# Patient Record
Sex: MALE | Race: WHITE | URBAN - METROPOLITAN AREA
[De-identification: names, ages, dates, MRNs, and addresses within clinical notes are randomized per-mention and may not be internally consistent; named-entity substitution may affect disease eponyms.]

---

## 2022-08-15 ENCOUNTER — HOSPITAL ENCOUNTER (INPATIENT)
Age: 72
LOS: 5 days | Discharge: HOME | DRG: 475 | End: 2022-08-20
Attending: INTERNAL MEDICINE | Admitting: INTERNAL MEDICINE
Payer: MEDICARE

## 2022-08-15 ENCOUNTER — APPOINTMENT (OUTPATIENT)
Dept: GENERAL RADIOLOGY | Age: 72
DRG: 475 | End: 2022-08-15
Attending: INTERNAL MEDICINE
Payer: MEDICARE

## 2022-08-15 DIAGNOSIS — M86.9 OSTEOMYELITIS, UNSPECIFIED SITE, UNSPECIFIED TYPE (HCC): ICD-10-CM

## 2022-08-15 PROBLEM — L03.031 CELLULITIS OF GREAT TOE OF RIGHT FOOT: Status: ACTIVE | Noted: 2022-08-15

## 2022-08-15 PROBLEM — L03.031 CELLULITIS AND ABSCESS OF TOE OF RIGHT FOOT: Status: ACTIVE | Noted: 2022-08-15

## 2022-08-15 PROBLEM — L02.611 CELLULITIS AND ABSCESS OF TOE OF RIGHT FOOT: Status: ACTIVE | Noted: 2022-08-15

## 2022-08-15 LAB
ABSOLUTE EOS #: 0.16 K/UL (ref 0–0.44)
ABSOLUTE IMMATURE GRANULOCYTE: 0.18 K/UL (ref 0–0.3)
ABSOLUTE LYMPH #: 1.22 K/UL (ref 1.1–3.7)
ABSOLUTE MONO #: 0.83 K/UL (ref 0.1–1.2)
BASOPHILS # BLD: 1 % (ref 0–2)
BASOPHILS ABSOLUTE: 0.06 K/UL (ref 0–0.2)
C-REACTIVE PROTEIN: 49.3 MG/L (ref 0–5)
EOSINOPHILS RELATIVE PERCENT: 2 % (ref 1–4)
HCT VFR BLD CALC: 36 % (ref 40.7–50.3)
HEMOGLOBIN: 11 G/DL (ref 13–17)
IMMATURE GRANULOCYTES: 2 %
LYMPHOCYTES # BLD: 13 % (ref 24–43)
MCH RBC QN AUTO: 32.5 PG (ref 25.2–33.5)
MCHC RBC AUTO-ENTMCNC: 30.6 G/DL (ref 28.4–34.8)
MCV RBC AUTO: 106.5 FL (ref 82.6–102.9)
MONOCYTES # BLD: 9 % (ref 3–12)
NRBC AUTOMATED: 0 PER 100 WBC
PDW BLD-RTO: 12.8 % (ref 11.8–14.4)
PLATELET # BLD: 337 K/UL (ref 138–453)
PMV BLD AUTO: 9 FL (ref 8.1–13.5)
RBC # BLD: 3.38 M/UL (ref 4.21–5.77)
RBC # BLD: ABNORMAL 10*6/UL
SEDIMENTATION RATE, ERYTHROCYTE: 103 MM/HR (ref 0–20)
SEG NEUTROPHILS: 73 % (ref 36–65)
SEGMENTED NEUTROPHILS ABSOLUTE COUNT: 7.33 K/UL (ref 1.5–8.1)
WBC # BLD: 9.8 K/UL (ref 3.5–11.3)

## 2022-08-15 PROCEDURE — 86140 C-REACTIVE PROTEIN: CPT

## 2022-08-15 PROCEDURE — 87185 SC STD ENZYME DETCJ PER NZM: CPT

## 2022-08-15 PROCEDURE — 87075 CULTR BACTERIA EXCEPT BLOOD: CPT

## 2022-08-15 PROCEDURE — 76937 US GUIDE VASCULAR ACCESS: CPT

## 2022-08-15 PROCEDURE — 80053 COMPREHEN METABOLIC PANEL: CPT

## 2022-08-15 PROCEDURE — 6370000000 HC RX 637 (ALT 250 FOR IP): Performed by: INTERNAL MEDICINE

## 2022-08-15 PROCEDURE — 85652 RBC SED RATE AUTOMATED: CPT

## 2022-08-15 PROCEDURE — 87186 SC STD MICRODIL/AGAR DIL: CPT

## 2022-08-15 PROCEDURE — 85025 COMPLETE CBC W/AUTO DIFF WBC: CPT

## 2022-08-15 PROCEDURE — 86403 PARTICLE AGGLUT ANTBDY SCRN: CPT

## 2022-08-15 PROCEDURE — 87076 CULTURE ANAEROBE IDENT EACH: CPT

## 2022-08-15 PROCEDURE — 1200000000 HC SEMI PRIVATE

## 2022-08-15 PROCEDURE — 73620 X-RAY EXAM OF FOOT: CPT

## 2022-08-15 PROCEDURE — 99222 1ST HOSP IP/OBS MODERATE 55: CPT | Performed by: INTERNAL MEDICINE

## 2022-08-15 PROCEDURE — 87205 SMEAR GRAM STAIN: CPT

## 2022-08-15 PROCEDURE — 2580000003 HC RX 258: Performed by: INTERNAL MEDICINE

## 2022-08-15 PROCEDURE — 87070 CULTURE OTHR SPECIMN AEROBIC: CPT

## 2022-08-15 PROCEDURE — 36415 COLL VENOUS BLD VENIPUNCTURE: CPT

## 2022-08-15 PROCEDURE — 87040 BLOOD CULTURE FOR BACTERIA: CPT

## 2022-08-15 RX ORDER — ONDANSETRON 2 MG/ML
4 INJECTION INTRAMUSCULAR; INTRAVENOUS EVERY 6 HOURS PRN
Status: DISCONTINUED | OUTPATIENT
Start: 2022-08-15 | End: 2022-08-20 | Stop reason: HOSPADM

## 2022-08-15 RX ORDER — ACETAMINOPHEN 325 MG/1
650 TABLET ORAL EVERY 6 HOURS PRN
Status: DISCONTINUED | OUTPATIENT
Start: 2022-08-15 | End: 2022-08-20 | Stop reason: HOSPADM

## 2022-08-15 RX ORDER — SODIUM CHLORIDE 0.9 % (FLUSH) 0.9 %
5-40 SYRINGE (ML) INJECTION EVERY 12 HOURS SCHEDULED
Status: DISCONTINUED | OUTPATIENT
Start: 2022-08-15 | End: 2022-08-20 | Stop reason: HOSPADM

## 2022-08-15 RX ORDER — ONDANSETRON 4 MG/1
4 TABLET, ORALLY DISINTEGRATING ORAL EVERY 8 HOURS PRN
Status: DISCONTINUED | OUTPATIENT
Start: 2022-08-15 | End: 2022-08-20 | Stop reason: HOSPADM

## 2022-08-15 RX ORDER — SODIUM CHLORIDE 9 MG/ML
INJECTION, SOLUTION INTRAVENOUS PRN
Status: DISCONTINUED | OUTPATIENT
Start: 2022-08-15 | End: 2022-08-20 | Stop reason: HOSPADM

## 2022-08-15 RX ORDER — ENOXAPARIN SODIUM 100 MG/ML
40 INJECTION SUBCUTANEOUS DAILY
Status: DISCONTINUED | OUTPATIENT
Start: 2022-08-15 | End: 2022-08-20 | Stop reason: HOSPADM

## 2022-08-15 RX ORDER — ACETAMINOPHEN 650 MG/1
650 SUPPOSITORY RECTAL EVERY 6 HOURS PRN
Status: DISCONTINUED | OUTPATIENT
Start: 2022-08-15 | End: 2022-08-20 | Stop reason: HOSPADM

## 2022-08-15 RX ORDER — POLYETHYLENE GLYCOL 3350 17 G/17G
17 POWDER, FOR SOLUTION ORAL DAILY PRN
Status: DISCONTINUED | OUTPATIENT
Start: 2022-08-15 | End: 2022-08-20 | Stop reason: HOSPADM

## 2022-08-15 RX ORDER — SODIUM CHLORIDE 0.9 % (FLUSH) 0.9 %
5-40 SYRINGE (ML) INJECTION PRN
Status: DISCONTINUED | OUTPATIENT
Start: 2022-08-15 | End: 2022-08-20 | Stop reason: HOSPADM

## 2022-08-15 RX ADMIN — SODIUM CHLORIDE, PRESERVATIVE FREE 10 ML: 5 INJECTION INTRAVENOUS at 20:47

## 2022-08-15 RX ADMIN — SODIUM CHLORIDE: 9 INJECTION, SOLUTION INTRAVENOUS at 20:43

## 2022-08-15 RX ADMIN — PIPERACILLIN AND TAZOBACTAM 3375 MG: 3; .375 INJECTION, POWDER, FOR SOLUTION INTRAVENOUS at 20:46

## 2022-08-15 RX ADMIN — ACETAMINOPHEN 650 MG: 325 TABLET ORAL at 21:04

## 2022-08-15 ASSESSMENT — ENCOUNTER SYMPTOMS
VOMITING: 0
NAUSEA: 0
COLOR CHANGE: 1

## 2022-08-15 ASSESSMENT — PAIN DESCRIPTION - DESCRIPTORS: DESCRIPTORS: ACHING;DISCOMFORT

## 2022-08-15 ASSESSMENT — PAIN DESCRIPTION - LOCATION: LOCATION: LEG;FOOT

## 2022-08-15 ASSESSMENT — PAIN SCALES - GENERAL: PAINLEVEL_OUTOF10: 7

## 2022-08-15 NOTE — CONSULTS
Consultation Note  Podiatric Medicine and Surgery     Subjective     Chief Complaint: Right hallux cellulitis    HPI:  Leonid Client is a 66 y.o. male seen at [facility] for right hallux cellulitis and possible abscess. Patient is not a diabetic. Patient has a history of CKD 3, hypertension. Patient was sent in by his PCP today. Patient does not follow a podiatrist.  Patient states for the last month his toe has been swelling intermittently. There is an ulceration at the distal aspect of the right hallux and at the dorsomedial aspect over the IPJ of the right hallux. Both are draining purulent fluid. Patient denies numbness bilaterally. He noticed draining about 2 weeks ago. He has been wrapping it with antibiotic ointment at home. Patient denies a history of cellulitis, ulcerations, or amputations to his feet bilaterally. Patient denies any previous surgery to his feet bilaterally. Patient admits to poor blood flow to feet bilaterally. Patient denies any other constitutional symptoms at this time including nausea, fever, vomiting, chills, shortness of breath. Has noticed mild drainage or blood coming from the site. Patient is afebrile at this time and vital signs are stable. Patient is awake alert and oriented. Denies any other pedal complaints at this time. PCP is No primary care provider on file. ROS:   Review of Systems   Constitutional:  Negative for activity change. HENT: Negative. Gastrointestinal:  Negative for nausea and vomiting. Musculoskeletal:  Positive for joint swelling. Negative for gait problem. Skin:  Positive for color change and wound. Neurological:  Negative for numbness. Past Medical History   has no past medical history on file. Past Surgical History   has no past surgical history on file.     Medications  Prior to Admission medications    Not on File    Scheduled Meds:   sodium chloride flush  5-40 mL IntraVENous 2 times per day    enoxaparin 40 mg SubCUTAneous Daily     Continuous Infusions:   sodium chloride       PRN Meds:.sodium chloride flush, sodium chloride, ondansetron **OR** ondansetron, polyethylene glycol, acetaminophen **OR** acetaminophen    Allergies  has No Known Allergies. Family History  family history is not on file. Social History   has no history on file for tobacco use.   has no history on file for alcohol use.   has no history on file for drug use. Objective     Vitals:  Patient Vitals for the past 8 hrs:   BP Temp Temp src Pulse Resp SpO2   08/15/22 1715 120/66 98.4 °F (36.9 °C) Oral 76 19 98 %     Average, Min, and Max for last 24 hours Vitals:  TEMPERATURE:  Temp  Av.4 °F (36.9 °C)  Min: 98.4 °F (36.9 °C)  Max: 98.4 °F (36.9 °C)    RESPIRATIONS RANGE: Resp  Av  Min: 19  Max: 19    PULSE RANGE: Pulse  Av  Min: 76  Max: 76    BLOOD PRESSURE RANGE:  Systolic (03NGT), KSK:068 , Min:120 , OUY:783   ; Diastolic (14GMX), VEC:27, Min:66, Max:66      PULSE OXIMETRY RANGE: SpO2  Av %  Min: 98 %  Max: 98 %  I&O:  No intake/output data recorded. CBC:  No results for input(s): WBC, HGB, HCT, PLT, CRP in the last 72 hours. Invalid input(s):  ESR     BMP:  No results for input(s): NA, K, CL, CO2, BUN, CREATININE, GLUCOSE, CALCIUM in the last 72 hours. Coags:  No results for input(s): APTT, PROT, INR in the last 72 hours. No results found for: LABA1C  No results found for: SEDRATE  No results found for: CRP      Lower Extremity Physical Exam:  Vascular: DP and PT pulses are palpable on the left, dopplerable on right. CFT <3 seconds to all digits. Hair growth is absent to the level of the digits. Bilateral pitting edema present bilaterally. Neuro: Saph/sural/SP/DP/plantar sensation intact to light touch. No loss of protective sensation noted bilaterally    Musculoskeletal: Muscle strength is 5/5 to all lower extremity muscle groups. Gross deformity is absent.     Dermatologic: Full thickness ulcer #1 located at the distal aspect of the right hallux and measures approximately 0.2 cm x 0.2 cm x 1.3 cm. Periwound skin is erythematous and atrophic. Moderate drainage noted with associated mal odor. Erythema present with associated increase in warmth. Lesion probes to bone. Does not sinus track or undermine. No fluctuance, crepitus, or induration. Interdigital maceration absent. Full-thickness ulcer #2 located to the dorsal medial aspect of the IPJ of the right hallux and measures approximately 0.2 cm x 0.2 cm x 0.4 cm. Mild drainage noted to the site with a malodor. Unclear if lesion probes to bone. Fluctuance present. No crepitus or induration. Clinical:             Imaging: Pending  XR FOOT RIGHT (2 VIEWS)    (Results Pending)       Cultures: Pending    Assessment     Sharon Giron is a 66 y.o. male with   Right hallux cellulitis  CKD 3  Lower extremity edema bilaterally    Principal Problem:    Cellulitis and abscess of toe of right foot  Active Problems:    Cellulitis of great toe of right foot  Resolved Problems:    * No resolved hospital problems. *        Plan     Patient examined and evaluated at bedside   Treatment options discussed in detail with the patient  Radiographs reviewed and discussed in detail with patient. Pending  Aerobic and anaerobic cultures taken, results pending. No debridement performed but ulcer did probe to bone. Purulent drainage able to be exsanguinated from the toe. Consults: Medicine(p), ID  ID to recommend antibiotics. Zosyn and vancomycin. May adjust pending culture results. Dressing applied to Right foot: Iodoform packing, 4 x 4, Kerlix. WBAT to Right lower extremity  Patient care discussed with  [facility].     Governor Heather DPM   Podiatric Medicine & Surgery   8/15/2022 at 6:25 PM

## 2022-08-15 NOTE — CONSULTS
[facility] - Initial Consult Note  Today's Date and Time: 8/15/2022, 5:51 PM    Impression :   Cellulitis and abscess of right hallux  CKD3 with Cr Clearance ~ 40 cc/min  CAD with prior stents  Peripheral arterial disease    Recommendations:   Zosyn 3.3 gm IV q 8 hr  Vancomycin 1250 mg q 24 hr  Podiatry consultation    Medical Decision Making/Summary/Discussion:8/15/2022       Infection Control Recommendations   [place] Precautions  Contact Isolation       Antimicrobial Stewardship Recommendations     Simplification of therapy  Targeted therapy  PK dosing    Coordination of Outpatient Care:   Estimated Length of IV antimicrobials:TBD  Patient will need Midline Catheter Insertion: TBD  Patient will need PICC line Insertion:  Patient will need: Home IV , [place],  SNF,  LTAC: TBD  Patient will need outpatient wound care:Yes    Chief complaint/reason for consultation:   Cellulitis of right hallux      History of Present Illness:   Ayana Santana is a 66y.o.-year-old  male who was initially admitted on 8/15/2022. Patient seen at the request of Dr. Jasvir Christian. INITIAL HISTORY:  Patient was sent to the hospital today for admission by his PCP. Patient  has a history of CKD stage 3 and hypertension. He is not diabetic, is not on immunosuppressive meds, and has no history of trauma or surgery to the right foot. Patient states his toe has been swelling intermittently for one month and he first noticed drainage 2 weeks ago. Patient has been applying dressings at home with antibiotic ointments. Patient has not treated with any other methods. Patient relates minor tenderness to the site with minimal drainage. Drainage noted to distoplantar aspect of hallux as well as dorsal medial aspect over the IPJ. Patient has no previous history of cellulitis, ulcerations, or amputations to his feet. Patient denies any other constitutional symptoms.  Patient denies nausea, fever, vomiting, chills, SOB at this time. No pain on palpation to right hallux. Right hallux is edematous. Pulses palpable on left, non-palpable on right. Drainage is foul smelling. Probes to bone. Purulent drainage. Aerobic and anaerobic cultures taken. Xrays of the right foot pending  Labs pending    Patient is afebrile. VSS on admission. Patient awake, alert, oriented        Labs, X rays reviewed: 8/15/2022    BUN: 36  Cr: 1.69    WBC:11.3  Hb:12.2  Plat: 258    Cultures:  Urine:    Blood:  8/15/22: Pending  Sputum :    Wound:                Discussed with patient, RN, family. I have personally reviewed the past medical history, past surgical history, medications, social history, and family history, and I have updated the database accordingly. Past Medical History:   No past medical history on file. Past Surgical  History:   No past surgical history on file. Medications:      sodium chloride flush  5-40 mL IntraVENous 2 times per day    enoxaparin  40 mg SubCUTAneous Daily       Social History:     Social History     Socioeconomic History    Marital status:      Spouse name: Not on file    Number of children: Not on file    Years of education: Not on file    Highest education level: Not on file   Occupational History    Not on file   Tobacco Use    Smoking status: Not on file    Smokeless tobacco: Not on file   Substance and Sexual Activity    Alcohol use: Not on file    Drug use: Not on file    Sexual activity: Not on file   Other Topics Concern    Not on file   Social History Narrative    Not on file     Social Determinants of Health     Financial Resource Strain: Not on file   Food Insecurity: Not on file   Transportation Needs: Not on file   Physical Activity: Not on file   Stress: Not on file   Social Connections: Not on file   Intimate Partner Violence: Not on file   Housing Stability: Not on file       Family History:   No family history on file.      Allergies:   Patient has no known allergies. Review of Systems:   Constitutional: No fevers or chills. No systemic complaints  Head: No headaches  Eyes: No double vision or blurry vision. No conjunctival inflammation. ENT: No sore throat or runny nose. . No hearing loss, tinnitus or vertigo. Cardiovascular: No chest pain or palpitations. No shortness of breath. No SULLIVAN  Lung: No shortness of breath or cough. No sputum production  Abdomen: No nausea, vomiting, diarrhea, or abdominal pain. [place] No cramps. Genitourinary: No increased urinary frequency, or dysuria. No hematuria. No suprapubic or CVA pain. CKD3  Musculoskeletal: No muscle aches or pains. No joint effusions, swelling or deformities  Hematologic: No bleeding or bruising. Neurologic: No headache, weakness, numbness, or tingling. Integument: No rash, no ulcers. Psychiatric: No depression. Endocrine: No polyuria, no polydipsia, no polyphagia. Physical Examination :   Patient Vitals for the past 8 hrs:   BP Temp Temp src Pulse Resp SpO2   08/15/22 1715 120/66 98.4 °F (36.9 °C) Oral 76 19 98 %     General Appearance: Awake, alert, and in no apparent distress  Head:  Normocephalic, no trauma  Eyes: Pupils equal, round, reactive to light and accommodation; extraocular movements intact; sclera anicteric; conjunctivae pink. No embolic phenomena. ENT: Oropharynx clear, without erythema, exudate, or thrush. No tenderness of sinuses. Mouth/throat: mucosa pink and moist. No lesions. Dentition in good repair. Neck:Supple, without lymphadenopathy. Thyroid normal, No bruits. Pulmonary/Chest: Clear to auscultation, without wheezes, rales, or rhonchi. No dullness to percussion. Cardiovascular: Regular rate and rhythm without murmurs, rubs, or gallops. Poor bloodflow to b/l extremities. Abdomen: Soft, non tender. Bowel sounds normal. No organomegaly  All four Extremities: No cyanosis, clubbing, edema, or effusions. Neurologic: No gross sensory or motor deficits.    Skin: Warm and dry with good turgor. No signs of peripheral arterial or venous insufficiency. Ulceration present to distal right hallux and dorsomedial hallux. Probes to bone. Purulent drainage. Medical Decision Making -Laboratory:   I have independently reviewed/ordered the following labs:    CBC with Differential: No results for input(s): WBC, HGB, HCT, PLT, SEGSPCT, BANDSPCT, LYMPHOPCT, MONOPCT, EOSPCT in the last 72 hours. BMP: No results for input(s): NA, K, CL, CO2, BUN, CREATININE, CA, MG in the last 72 hours. Hepatic Function Panel: No results for input(s): PROT, LABALBU, BILIDIR, IBILI, BILITOT, ALKPHOS, ALT, AST in the last 72 hours. No results for input(s): RPR in the last 72 hours. No results for input(s): HIV in the last 72 hours. No results for input(s): BC in the last 72 hours. No results found for: MUCUS, PH, RBC, TRICHOMONAS, WBC, YEAST, TURBIDITY  No results found for: CREATININE, GLUCOSE, SODIUM, KETONES    Medical Decision Making-Imaging:       Medical Decision [id number]-[id number]-Mescy:       Medical Decision Making-Other:     Note:  Labs, medications, radiologic studies were reviewed with personal review of films  Moderate Large amounts of data were reviewed  Discussed with nursing Staff, Discharge planner  Infection Control and Prevention measures reviewed  All prior entries were reviewed  Administer medications as ordered  Prognosis: Good  Discharge planning reviewed  Follow up as outpatient. Thank you for allowing us to participate in the care of this patient. Please call with questions. Poornima Somers, DPM  Podiatric Medicine, PGY-1  5 [facility]:    I have discussed the case, including pertinent history and exam findings with the residents and students. I have seen and examined the patient and the key elements of the encounter have been performed by me. I was present when the student obtained his information or examined the patient.  I have reviewed the laboratory data, other diagnostic studies and discussed them with the residents. I have updated the medical record where necessary. I agree with the assessment, plan and orders as documented by the resident/ student.     Morenita Elise MD.      Pager: [phone] - Office: [phone]

## 2022-08-16 LAB
ALBUMIN SERPL-MCNC: 3.4 G/DL (ref 3.5–5.2)
ALBUMIN/GLOBULIN RATIO: 0.8 (ref 1–2.5)
ALP BLD-CCNC: 98 U/L (ref 40–129)
ALT SERPL-CCNC: 28 U/L (ref 5–41)
ANION GAP SERPL CALCULATED.3IONS-SCNC: 17 MMOL/L (ref 9–17)
AST SERPL-CCNC: 34 U/L
BILIRUB SERPL-MCNC: 0.35 MG/DL (ref 0.3–1.2)
BUN BLDV-MCNC: 25 MG/DL (ref 8–23)
CALCIUM SERPL-MCNC: 9.4 MG/DL (ref 8.6–10.4)
CHLORIDE BLD-SCNC: 106 MMOL/L (ref 98–107)
CO2: 18 MMOL/L (ref 20–31)
CREAT SERPL-MCNC: 1.46 MG/DL (ref 0.7–1.2)
GFR AFRICAN AMERICAN: 57 ML/MIN
GFR NON-AFRICAN AMERICAN: 47 ML/MIN
GFR SERPL CREATININE-BSD FRML MDRD: ABNORMAL ML/MIN/{1.73_M2}
GLUCOSE BLD-MCNC: 97 MG/DL (ref 70–99)
POTASSIUM SERPL-SCNC: 4.3 MMOL/L (ref 3.7–5.3)
SODIUM BLD-SCNC: 141 MMOL/L (ref 135–144)
TOTAL PROTEIN: 7.6 G/DL (ref 6.4–8.3)

## 2022-08-16 PROCEDURE — 6370000000 HC RX 637 (ALT 250 FOR IP): Performed by: INTERNAL MEDICINE

## 2022-08-16 PROCEDURE — 2580000003 HC RX 258: Performed by: INTERNAL MEDICINE

## 2022-08-16 PROCEDURE — 99232 SBSQ HOSP IP/OBS MODERATE 35: CPT | Performed by: INTERNAL MEDICINE

## 2022-08-16 PROCEDURE — 6360000002 HC RX W HCPCS

## 2022-08-16 PROCEDURE — 93923 UPR/LXTR ART STDY 3+ LVLS: CPT

## 2022-08-16 PROCEDURE — 6360000002 HC RX W HCPCS: Performed by: INTERNAL MEDICINE

## 2022-08-16 PROCEDURE — 1200000000 HC SEMI PRIVATE

## 2022-08-16 PROCEDURE — 2580000003 HC RX 258

## 2022-08-16 RX ADMIN — PIPERACILLIN AND TAZOBACTAM 3375 MG: 3; .375 INJECTION, POWDER, FOR SOLUTION INTRAVENOUS at 03:46

## 2022-08-16 RX ADMIN — Medication 1250 MG: at 00:55

## 2022-08-16 RX ADMIN — PIPERACILLIN AND TAZOBACTAM 3375 MG: 3; .375 INJECTION, POWDER, FOR SOLUTION INTRAVENOUS at 10:50

## 2022-08-16 RX ADMIN — ACETAMINOPHEN 650 MG: 325 TABLET ORAL at 20:08

## 2022-08-16 RX ADMIN — ACETAMINOPHEN 650 MG: 325 TABLET ORAL at 10:48

## 2022-08-16 RX ADMIN — ACETAMINOPHEN 650 MG: 325 TABLET ORAL at 03:51

## 2022-08-16 RX ADMIN — PIPERACILLIN AND TAZOBACTAM 3375 MG: 3; .375 INJECTION, POWDER, FOR SOLUTION INTRAVENOUS at 20:03

## 2022-08-16 RX ADMIN — SODIUM CHLORIDE, PRESERVATIVE FREE 10 ML: 5 INJECTION INTRAVENOUS at 09:17

## 2022-08-16 RX ADMIN — ENOXAPARIN SODIUM 40 MG: 100 INJECTION SUBCUTANEOUS at 09:17

## 2022-08-16 RX ADMIN — Medication 1250 MG: at 22:00

## 2022-08-16 ASSESSMENT — PAIN SCALES - GENERAL
PAINLEVEL_OUTOF10: 2
PAINLEVEL_OUTOF10: 5
PAINLEVEL_OUTOF10: 3
PAINLEVEL_OUTOF10: 5

## 2022-08-16 ASSESSMENT — PAIN DESCRIPTION - DESCRIPTORS
DESCRIPTORS: ACHING;DISCOMFORT

## 2022-08-16 ASSESSMENT — PAIN DESCRIPTION - LOCATION
LOCATION: LEG;FOOT
LOCATION: ANKLE;FOOT
LOCATION: ANKLE;FOOT

## 2022-08-16 ASSESSMENT — PAIN DESCRIPTION - ORIENTATION
ORIENTATION: RIGHT

## 2022-08-16 ASSESSMENT — PAIN DESCRIPTION - PAIN TYPE: TYPE: ACUTE PAIN

## 2022-08-16 ASSESSMENT — PAIN - FUNCTIONAL ASSESSMENT: PAIN_FUNCTIONAL_ASSESSMENT: PREVENTS OR INTERFERES SOME ACTIVE ACTIVITIES AND ADLS

## 2022-08-16 ASSESSMENT — PAIN DESCRIPTION - ONSET: ONSET: ON-GOING

## 2022-08-16 ASSESSMENT — PAIN DESCRIPTION - FREQUENCY: FREQUENCY: CONTINUOUS

## 2022-08-16 NOTE — CARE COORDINATION
08/16/22 0856   Service Assessment   Patient Orientation Alert and Oriented   Cognition Alert   History Provided By Patient   Primary Caregiver Self   Support Systems Spouse/Significant Other  (lives with wife)   [address] [facility] is: Legal Next of Herman Mccord   PCP Verified by CM Yes  (Dr. Grecia Allred)   Last Visit to PCP Within last 3 months   Prior Functional Level Independent in ADLs/IADLs;Mobility   Can patient return to prior living arrangement Yes   Ability to make needs known: Good   Family able to assist with home care needs: Yes   Social/Functional History   Lives With Spouse   Type of [address] Two level;Bed/Bath upstairs  (1 1/2  story)   Home Access Stairs to enter without rails   Entrance Stairs - Number of Steps 1   [address]ane;Walker, rolling   ADL Assistance Independent   Homemaking Assistance Independent   Ambulation Assistance Independent   Transfer Assistance Independent   Active  Yes   Mode of Transportation Car   Occupation Retired   Discharge Planning   Living Arrangements Spouse/Significant Other   [address] Medications No   One/Two [address] level   History of falls? [address] Discharge Home Health;IV Therapy    Resource Information Provided? Yes   Mode of Transport at Discharge   (brother)   Condition of Participation: Discharge Planning   The Plan for Transition of Care is related to the following treatment goals: \"survive\"   Plan is home with spouse. Has teachable caregiver if discharged on IV ABX. Has ride.

## 2022-08-16 NOTE — PROGRESS NOTES
[facility] - Progress Note    Today's Date and Time: 8/16/2022, 10:36 AM    Impression :   Cellulitis and abscess of right hallux  CKD3 with Cr Clearance ~ 40 cc/min  CAD with prior stents  Peripheral arterial disease    Recommendations:   Zosyn 3.3 gm IV q 8 hr  Vancomycin 1250 mg q 24 hr  Podiatry consultation    Medical Decision Making/Summary/Discussion:8/16/2022       Infection Control Recommendations   [place] Precautions  Contact Isolation       Antimicrobial Stewardship Recommendations     Simplification of therapy  Targeted therapy  PK dosing    Coordination of Outpatient Care:   Estimated Length of IV antimicrobials:TBD  Patient will need Midline Catheter Insertion: TBD  Patient will need PICC line Insertion:  Patient will need: Home IV , [place],  SNF,  LTAC: TBD  Patient will need outpatient wound care:Yes    Chief complaint/reason for consultation:   Cellulitis of right hallux      History of Present Illness:   Scott Hernandez is a 66y.o.-year-old  male who was initially admitted on 8/15/2022. Patient seen at the request of Dr. Tripp Morillo. INITIAL HISTORY:  Patient was sent to the hospital today for admission by his PCP. Patient  has a history of CKD stage 3 and hypertension. He is not diabetic, is not on immunosuppressive meds, and has no history of trauma or surgery to the right foot. Patient states his toe has been swelling intermittently for one month and he first noticed drainage 2 weeks ago. Patient has been applying dressings at home with antibiotic ointments. Patient has not treated with any other methods. Patient relates minor tenderness to the site with minimal drainage. Drainage noted to distoplantar aspect of hallux as well as dorsal medial aspect over the IPJ. Patient has no previous history of cellulitis, ulcerations, or amputations to his feet. Patient denies any other constitutional symptoms.  Patient denies nausea, fever, vomiting, chills, SOB at this time. No pain on palpation to right hallux. Right hallux is edematous. Pulses palpable on left, non-palpable on right. Drainage is foul smelling. Probes to bone. Purulent drainage. Aerobic and anaerobic cultures taken. Xrays of the right foot pending  Labs pending    Patient is afebrile. VSS on admission. Patient awake, alert, oriented    CURRENT EVALUATION : 8/16/2022      Afebrile  VS stable    Patient stable  No complaints  No new issues per RN    Evaluated by Podiatry  Foot Xray: Poor quality. Possible erosion distal tuft of hallux. MRI foot pending    Cultures:  Blood: No growth  Wound: pending    Labs, X rays reviewed: 8/16/2022    BUN: 36  Cr: 1.69    WBC:11.3  Hb:12.2  Plat: 258    Cultures:  Urine:    Blood:  8/15/22: No growth    Sputum :    Wound:  8-15-22: pending        8-16-22:            8-15-22:            Discussed with patient, RN, Podiatry. I have personally reviewed the past medical history, past surgical history, medications, social history, and family history, and I have updated the database accordingly. Past Medical History:   No past medical history on file. Past Surgical  History:   No past surgical history on file.     Medications:      sodium chloride flush  5-40 mL IntraVENous 2 times per day    enoxaparin  40 mg SubCUTAneous Daily    piperacillin-tazobactam  3,375 mg IntraVENous Q8H    vancomycin  1,250 mg IntraVENous Q24H       Social History:     Social History     Socioeconomic History    Marital status:      Spouse name: Not on file    Number of children: Not on file    Years of education: Not on file    Highest education level: Not on file   Occupational History    Not on file   Tobacco Use    Smoking status: Not on file    Smokeless tobacco: Not on file   Substance and Sexual Activity    Alcohol use: Not on file    Drug use: Not on file    Sexual activity: Not on file   Other Topics Concern    Not on file   Social History Narrative    Not on file     Social Determinants of Health     Financial Resource Strain: Not on file   Food Insecurity: Not on file   Transportation Needs: Not on file   Physical Activity: Not on file   Stress: Not on file   Social Connections: Not on file   Intimate Partner Violence: Not on file   Housing Stability: Not on file       Family History:   No family history on file. Allergies:   Patient has no known allergies. Review of Systems:   Constitutional: No fevers or chills. No systemic complaints  Head: No headaches  Eyes: No double vision or blurry vision. No conjunctival inflammation. ENT: No sore throat or runny nose. . No hearing loss, tinnitus or vertigo. Cardiovascular: No chest pain or palpitations. No shortness of breath. No SULLIVAN  Lung: No shortness of breath or cough. No sputum production  Abdomen: No nausea, vomiting, diarrhea, or abdominal pain. Annika Gull No cramps. Genitourinary: No increased urinary frequency, or dysuria. No hematuria. No suprapubic or CVA pain. CKD3  Musculoskeletal: No muscle aches or pains. No joint effusions, swelling or deformities  Hematologic: No bleeding or bruising. Neurologic: No headache, weakness, numbness, or tingling. Integument: No rash, Hallux ulcers. Psychiatric: No depression. Endocrine: No polyuria, no polydipsia, no polyphagia. Physical Examination :   Patient Vitals for the past 8 hrs:   BP Temp Temp src Pulse Resp SpO2   08/16/22 0809 125/66 98.2 °F (36.8 °C) Oral 72 15 96 %       General Appearance: Awake, alert, and in no apparent distress  Head:  Normocephalic, no trauma  Eyes: Pupils equal, round, reactive to light and accommodation; extraocular movements intact; sclera anicteric; conjunctivae pink. No embolic phenomena. ENT: Oropharynx clear, without erythema, exudate, or thrush. No tenderness of sinuses. Mouth/throat: mucosa pink and moist. No lesions. Dentition in good repair. Neck:Supple, without lymphadenopathy.  Thyroid normal, No bruits. Pulmonary/Chest: Clear to auscultation, without wheezes, rales, or rhonchi. No dullness to percussion. Cardiovascular: Regular rate and rhythm without murmurs, rubs, or gallops. Poor bloodflow to b/l extremities. Abdomen: Soft, non tender. Bowel sounds normal. No organomegaly  All four Extremities: No cyanosis, clubbing, edema, or effusions. Neurologic: No gross sensory or motor deficits. Skin: Warm and dry with good turgor. Signs of peripheral arterial  insufficiency. Ulceration present to distal right hallux and dorsomedial hallux. Probes to bone. Purulent drainage. Medical Decision Making -Laboratory:   I have independently reviewed/ordered the following labs:    CBC with Differential:   Recent Labs     08/15/22  1821   WBC 9.8   HGB 11.0*   HCT 36.0*      LYMPHOPCT 13*   MONOPCT 9     BMP:   Recent Labs     08/15/22  1821      K 4.3      CO2 18*   BUN 25*   CREATININE 1.46*     Hepatic Function Panel:   Recent Labs     08/15/22  1821   PROT 7.6   LABALBU 3.4*   BILITOT 0.35   ALKPHOS 98   ALT 28   AST 34     No results for input(s): RPR in the last 72 hours. No results for input(s): HIV in the last 72 hours. No results for input(s): BC in the last 72 hours. Lab Results   Component Value Date/Time    RBC 3.38 08/15/2022 06:21 PM    WBC 9.8 08/15/2022 06:21 PM     Lab Results   Component Value Date/Time    CREATININE 1.46 08/15/2022 06:21 PM    GLUCOSE 97 08/15/2022 06:21 PM       Medical Decision Making-Imaging:     EXAMINATION:   TWO XRAY VIEWS OF THE RIGHT FOOT       8/15/2022 7:28 pm       COMPARISON:   None. HISTORY:   ORDERING SYSTEM PROVIDED HISTORY: cellulitis and abscess   TECHNOLOGIST PROVIDED HISTORY:   cellulitis and abscess       FINDINGS:   Erosive changes of the tuft of the 1st distal phalanx. Diffuse osseous   demineralization. Normal midfoot alignment is grossly maintained. No   fracture or dislocation.   Questionable soft tissue ulceration at the

## 2022-08-16 NOTE — PROGRESS NOTES
Progress Note  Podiatric Medicine and Surgery     Subjective     CC: Right hallux cellulitis    - Patient seen and examined at bedside. Patient resting comfortably in his bed  - No acute events overnight. - Afebrile, vital signs stable   - Patient says that he is having minimal pain to his toe.  - Says that the nurse had to redress his foot    HPI :  Antonio Yeh is a 66 y.o. male seen at [facility]. [facility] for right hallux cellulitis and possible abscess. Patient is not a diabetic. Patient has a history of CKD 3, hypertension. Patient was sent in by his PCP today. Patient does not follow a podiatrist.  Patient states for the last month his toe has been swelling intermittently. There is an ulceration at the distal aspect of the right hallux and at the dorsomedial aspect over the IPJ of the right hallux. Both are draining purulent fluid. Patient denies numbness bilaterally. He noticed draining about 2 weeks ago. He has been wrapping it with antibiotic ointment at home. Patient denies a history of cellulitis, ulcerations, or amputations to his feet bilaterally. Patient denies any previous surgery to his feet bilaterally. Patient admits to poor blood flow to feet bilaterally. Patient denies any other constitutional symptoms at this time including nausea, fever, vomiting, chills, shortness of breath. Has noticed mild drainage or blood coming from the site. Patient is afebrile at this time and vital signs are stable. Patient is awake alert and oriented. Denies any other pedal complaints at this time. PCP is No primary care provider on file. ROS: Denies N/V/F/C/SOB/CP. Otherwise negative except at stated in the HPI.      Medications:  Scheduled Meds:   sodium chloride flush  5-40 mL IntraVENous 2 times per day    enoxaparin  40 mg SubCUTAneous Daily    piperacillin-tazobactam  3,375 mg IntraVENous Q8H    vancomycin  1,250 mg IntraVENous Q24H       Continuous Infusions:   sodium chloride 5 mL/hr at 22 0049       PRN Meds:sodium chloride flush, sodium chloride, ondansetron **OR** ondansetron, polyethylene glycol, acetaminophen **OR** acetaminophen    Objective     Vitals:  Patient Vitals for the past 8 hrs:   BP Temp Temp src Pulse Resp SpO2   22 0809 125/66 98.2 °F (36.8 °C) Oral 72 15 96 %     Average, Min, and Max for last 24 hours Vitals:  TEMPERATURE:  Temp  Av.2 °F (36.8 °C)  Min: 97.9 °F (36.6 °C)  Max: 98.4 °F (36.9 °C)    RESPIRATIONS RANGE: Resp  Av  Min: 15  Max: 20    PULSE RANGE: Pulse  Av.3  Min: 72  Max: 81    BLOOD PRESSURE RANGE:  Systolic (82EAB), TTT:986 , Min:120 , GEU:287   ; Diastolic (36ESY), MGJ:44, Min:66, Max:66      PULSE OXIMETRY RANGE: SpO2  Av.7 %  Min: 96 %  Max: 99 %    I/O last 3 completed shifts: In: 91.6 [I.V.:15; IV Piggyback:76.7]  Out: 700 [Urine:700]    CBC:  Recent Labs     08/15/22  1821   WBC 9.8   HGB 11.0*   HCT 36.0*      CRP 49.3*        BMP:  Recent Labs     08/15/22  1821      K 4.3      CO2 18*   BUN 25*   CREATININE 1.46*   GLUCOSE 97   CALCIUM 9.4        Coags:  Recent Labs     08/15/22  1821   PROT 7.6       Lab Results   Component Value Date    SEDRATE 103 (H) 08/15/2022     Recent Labs     08/15/22  182   CRP 49.3*       Lower Extremity Physical Exam:  Vascular: DP and PT pulses are palpable on the left, dopplerable on right. CFT <3 seconds to all digits. Hair growth is absent to the level of the digits. Bilateral pitting edema present bilaterally. Neuro: Saph/sural/SP/DP/plantar sensation intact to light touch. No loss of protective sensation noted bilaterally     Musculoskeletal: Muscle strength is 5/5 to all lower extremity muscle groups. Gross deformity is absent. Dermatologic: Full thickness ulcer #1 located at the distal aspect of the right hallux and measures approximately 0.2 cm x 0.2 cm x 1.3 cm. Periwound skin is erythematous and atrophic.   Moderate drainage noted with associated mal odor. Erythema present with associated increase in warmth. Lesion probes to bone. Does not sinus track or undermine. No fluctuance, crepitus, or induration. Interdigital maceration absent. Full-thickness ulcer #2 located to the dorsal medial aspect of the IPJ of the right hallux and measures approximately 0.2 cm x 0.2 cm x 0.4 cm. Mild drainage noted to the site with a malodor. Unclear if lesion probes to bone. Fluctuance present. No crepitus or induration. Clinical Images:            Imaging:   XR FOOT RIGHT (2 VIEWS)   Final Result   Questionable soft tissue ulceration of the distal aspect of the 1st toe and   erosive changes of the tuft of the 1st distal phalanx compatible with   osteomyelitis. MRI FOOT RIGHT WO CONTRAST    (Results Pending)   VL LOWER EXTREMITY ARTERIAL SEGMENTAL PRESSURES W PPG    (Results Pending)       Cultures: Cultures taken at bedside on 08/15/22. Results pending. Assessment   Seng Orr is a 66 y.o. male with   Right hallux cellulitis  Diabetic foot ulcer down to bone, right foot  CKD 3  Lower extremity edema bilaterally    Principal Problem:    Cellulitis and abscess of toe of right foot  Active Problems:    Cellulitis of great toe of right foot  Resolved Problems:    * No resolved hospital problems. *       Plan     Patient examined and evaluated at bedside  Treatment options discussed in detail with the patient  Radiographs reviewed and discussed in detail with patient. Erosive changes of the tuft of the 1st distal phalanx compatible with osteomyelitis   MRI ordered for osteomyelitis evaluation. Waiting to be performed. Aerobic and anaerobic cultures taken, results pending. ID to recommend antibiotics. Zosyn and vancomycin. May adjust pending culture results. Dressing removed during today's visit to evaluate Right hallux   Dressing applied to Right foot:  Iodoform packing, betadine, 4 x 4, Kerlix ACE  WBAT to Right lower extremity  Patient care discussed with Dr. Traci Bhardwaj. Fany Sexton DPM   Podiatric Medicine & Surgery   8/16/2022 at 8:11 AM     Senior Resident Statement:  I have discussed the case, including pertinent history and exam findings with the resident. I agree with the assessment, plan and orders as documented by the intern. Any changes were made in the note above.        Electronically signed by Meeta Cassidy DPM on 8/16/2022 at 11:26 AM

## 2022-08-17 ENCOUNTER — APPOINTMENT (OUTPATIENT)
Dept: MRI IMAGING | Age: 72
DRG: 475 | End: 2022-08-17
Attending: INTERNAL MEDICINE
Payer: MEDICARE

## 2022-08-17 LAB
CULTURE: ABNORMAL
CULTURE: ABNORMAL
DIRECT EXAM: ABNORMAL
DIRECT EXAM: ABNORMAL
SPECIMEN DESCRIPTION: ABNORMAL

## 2022-08-17 PROCEDURE — 6360000002 HC RX W HCPCS

## 2022-08-17 PROCEDURE — 99232 SBSQ HOSP IP/OBS MODERATE 35: CPT | Performed by: INTERNAL MEDICINE

## 2022-08-17 PROCEDURE — 6370000000 HC RX 637 (ALT 250 FOR IP): Performed by: INTERNAL MEDICINE

## 2022-08-17 PROCEDURE — 73718 MRI LOWER EXTREMITY W/O DYE: CPT

## 2022-08-17 PROCEDURE — 1200000000 HC SEMI PRIVATE

## 2022-08-17 PROCEDURE — 6360000002 HC RX W HCPCS: Performed by: INTERNAL MEDICINE

## 2022-08-17 PROCEDURE — 2580000003 HC RX 258

## 2022-08-17 RX ADMIN — PIPERACILLIN AND TAZOBACTAM 3375 MG: 3; .375 INJECTION, POWDER, FOR SOLUTION INTRAVENOUS at 11:18

## 2022-08-17 RX ADMIN — ACETAMINOPHEN 650 MG: 325 TABLET ORAL at 03:39

## 2022-08-17 RX ADMIN — ACETAMINOPHEN 650 MG: 325 TABLET ORAL at 17:38

## 2022-08-17 RX ADMIN — ENOXAPARIN SODIUM 40 MG: 100 INJECTION SUBCUTANEOUS at 08:06

## 2022-08-17 RX ADMIN — ACETAMINOPHEN 650 MG: 325 TABLET ORAL at 11:22

## 2022-08-17 RX ADMIN — Medication 1250 MG: at 21:30

## 2022-08-17 RX ADMIN — PIPERACILLIN AND TAZOBACTAM 3375 MG: 3; .375 INJECTION, POWDER, FOR SOLUTION INTRAVENOUS at 03:36

## 2022-08-17 ASSESSMENT — PAIN DESCRIPTION - ORIENTATION
ORIENTATION: RIGHT
ORIENTATION: RIGHT

## 2022-08-17 ASSESSMENT — PAIN SCALES - GENERAL
PAINLEVEL_OUTOF10: 6
PAINLEVEL_OUTOF10: 2
PAINLEVEL_OUTOF10: 3
PAINLEVEL_OUTOF10: 3
PAINLEVEL_OUTOF10: 7

## 2022-08-17 ASSESSMENT — PAIN DESCRIPTION - LOCATION
LOCATION: FOOT
LOCATION: FOOT

## 2022-08-17 NOTE — H&P
[address]                  [facility]                              HISTORY AND PHYSICAL    PATIENT NAME: Latasha Tsang                   :        1950  MED REC NO:   [id number]                             ROOM:       [id number]  ACCOUNT NO:   [de-identified]                           ADMIT DATE: 08/15/2022  PROVIDER:     Anna Marie Lara    CHIEF COMPLAINT:  Cellulitis and abscess of the right great toe. HISTORY OF PRESENT ILLNESS:  The patient is a 64-year-old  male  with essential hypertension, nephrolithiasis, and aortic stenosis as  well as osteoarthritis of the knees and chronic kidney disease stage  IIIA who presented to his primary MD's office with complaints of a right  great toe infection. The patient apparently had been experiencing  drainage from the right great toe for 2 weeks before seeking medical  attention. Upon evaluation in the office, the patient had erythema,  swelling and increased warmth involving the right great toe, there was  purulent discharge from the dorsum of the distal phalanx and also on the  plantar side. There was a significant tenderness to palpation as well. The patient was directly admitted to [facility] for IV  antibiotics, infectious disease consultation and podiatric evaluation. The patient has been seen by the infectious disease specialist and by  Podiatry. The patient had an x-ray of the right foot which suggested  osteomyelitis involving the distal phalanx of the right great toe. MRI  of the foot is being scheduled. The patient currently denies any fevers  or chills. He had PVRs of the lower extremities earlier which were  unremarkable. PAST MEDICAL HISTORY:  Significant for essential hypertension,  osteoarthritis of the knees, chronic kidney disease stage 3A,  nephrolithiasis, aortic stenosis.     PAST SURGICAL HISTORY:  No past surgical history on file.    SOCIAL HISTORY:  The patient is . No tobacco, alcohol or illicit  drug use. FAMILY HISTORY:  No pertinent family history. MEDICATIONS:  The patient's home medication list was not available. ALLERGIES:  No known drug allergies. REVIEW OF SYSTEMS:  GENERAL:  The patient denies any fevers, chills, or weight changes. CARDIOVASCULAR:  The patient denies chest pain and palpitations. He  does have history of aortic stenosis. PULMONARY:  The patient denies any increasing shortness of breath,  cough, dyspnea on exertion or wheezing. GI:  The patient denies nausea, vomiting, diarrhea, constipation,  melena, and hematochezia. :  The patient denies dysuria and hematuria. He does have a history  of nephrolithiasis. MUSCULOSKELETAL:  The patient complains of some myalgias and  arthralgias. He has a history of osteoarthritis involving the knees. NEUROLOGIC:  The patient denies any focal weakness, headaches or  paresthesias. HEENT:  The patient denies any recent change in his vision or hearing. SKIN:  The patient denies any new rashes or moles. He does admit to  redness and swelling involving the right great toe. PSYCHOLOGIC:  The patient denies depression and anxiety. PHYSICAL EXAMINATION:  GENERAL:  The patient is a pleasant 22-year-old  male, lying in  bed, in no acute distress. VITAL SIGNS:  Revealed a temperature of 98.2, pulse 77, respiratory rate  15, blood pressure 139/69 and 97% saturation. HEENT:  Revealed normocephalic, atraumatic skull. The patient had moist  oral mucous membranes. NECK:  Supple without lymphadenopathy. LUNGS:  Clear to auscultation. CARDIOVASCULAR:  Regular rate and rhythm with a 3/6 systolic murmur  heard loudest at the right upper sternal border. ABDOMEN:  Soft, nondistended, normoactive bowel sounds, no organomegaly. EXTREMITIES:  Revealed no clubbing, cyanosis or edema. The right great  toe was currently bandaged.   NEUROLOGIC:  The patient was alert and oriented x3. Cranial nerves II  through XII were intact. He removed all four extremities equally and  toes were downgoing. LABORATORY DATA:  The patient's sodium was 141, potassium 4.3, chloride  106, CO2 of 18, BUN 25, creatinine 1.46, glucose 97, calcium 9.4, total  protein 7.6. CRP 49.3. Albumin 3.4. Alk phos 98, ALT 28, AST 34,  bilirubin 0.35, total protein 7.6. White blood cell count 9.8,  hemoglobin 11.0, hematocrit 36.0, .5 and platelet count 293. The  patient had cultures of the foot wound which was positive for light  growth of staph aureus. Blood cultures were negative to date. X-rays  of the right foot revealed questionable soft tissue ulceration of the  distal aspects of the first toe and erosive changes of the tuft of the  first distal phalanx compatible with osteomyelitis. ASSESSMENT AND PLAN:  1. Right great toe cellulitis with abscess. There is some question of  whether the patient has osteomyelitis. IV antibiotic therapy was  initiated by the infectious disease doctor. The patient is currently  receiving vancomycin and Zosyn. Podiatry has been consulted. An MRI of  the right foot without contrast has been ordered. 2.  Essential hypertension. Continue to monitor the patient's blood  pressure. We will check office records for patient's antihypertensive  medication. 3.  History of nephrolithiasis. We will monitor for signs and symptoms  of kidney stones. Continue to encourage oral fluid intake. 4.  Macrocytic anemia. The plan to check B12 and folate levels. 5.  DVT prophylaxis. Continue with Lovenox 40 mg subcu daily. 6.  Chronic kidney disease stage 3A. Continue to monitor BUN and  creatinine, especially with vancomycin use.         Marcos Mcneal    D: 08/16/2022 20:27:07       T: 08/16/2022 20:33:23     [email or web address]  Job#: [id number]     Doc#: [id number]    CC:

## 2022-08-17 NOTE — CARE COORDINATION
CM spoke with patient to discuss transitional planning. The possibility of need home IV ABX discussed. [place] [id number] list given. Patient to provide [place] [id number] choices after reviewing. Patient states he has no preference for infusion company. Referral sent to [facility].

## 2022-08-17 NOTE — PROGRESS NOTES
Progress Note  Podiatric Medicine and Surgery     Subjective     CC: Right hallux cellulitis    - Patient seen and examined at bedside. Patient resting comfortably in his bed. Denies any changes overnight no acute events overnight. - Afebrile, vital signs stable       HPI :  Mady Medina is a 66 y.o. male seen at [facility] for right hallux cellulitis and possible abscess. Patient is not a diabetic. Patient has a history of CKD 3, hypertension. Patient was sent in by his PCP today. Patient does not follow a podiatrist.  Patient states for the last month his toe has been swelling intermittently. There is an ulceration at the distal aspect of the right hallux and at the dorsomedial aspect over the IPJ of the right hallux. Both are draining purulent fluid. Patient denies numbness bilaterally. He noticed draining about 2 weeks ago. He has been wrapping it with antibiotic ointment at home. Patient denies a history of cellulitis, ulcerations, or amputations to his feet bilaterally. Patient denies any previous surgery to his feet bilaterally. Patient admits to poor blood flow to feet bilaterally. Patient denies any other constitutional symptoms at this time including nausea, fever, vomiting, chills, shortness of breath. Has noticed mild drainage or blood coming from the site. Patient is afebrile at this time and vital signs are stable. Patient is awake alert and oriented. Denies any other pedal complaints at this time. PCP is No primary care provider on file. ROS: Denies N/V/F/C/SOB/CP. Otherwise negative except at stated in the HPI.      Medications:  Scheduled Meds:   vancomycin  1,250 mg IntraVENous Q24H    sodium chloride flush  5-40 mL IntraVENous 2 times per day    enoxaparin  40 mg SubCUTAneous Daily       Continuous Infusions:   sodium chloride 5 mL/hr at 08/16/22 0049       PRN Meds:sodium chloride flush, sodium chloride, ondansetron **OR** ondansetron, polyethylene glycol, acetaminophen **OR** acetaminophen    Objective     Vitals:  No data found. Average, Min, and Max for last 24 hours Vitals:  TEMPERATURE:  Temp  Av.3 °F (36.8 °C)  Min: 98.2 °F (36.8 °C)  Max: 98.4 °F (36.9 °C)    RESPIRATIONS RANGE: Resp  Avg: 15.5  Min: 15  Max: 16    PULSE RANGE: Pulse  Av.5  Min: 74  Max: 77    BLOOD PRESSURE RANGE:  Systolic (73QHY), BXQ:281 , Min:139 , KZT:283   ; Diastolic (58JYG), HXC:24, Min:69, Max:74      PULSE OXIMETRY RANGE: SpO2  Av.5 %  Min: 96 %  Max: 97 %    I/O last 3 completed shifts: In: 91.6 [I.V.:15; IV Piggyback:76.7]  Out: 1900 [Urine:1900]    CBC:  Recent Labs     08/15/22  1821   WBC 9.8   HGB 11.0*   HCT 36.0*      CRP 49.3*          BMP:  Recent Labs     08/15/22  1821      K 4.3      CO2 18*   BUN 25*   CREATININE 1.46*   GLUCOSE 97   CALCIUM 9.4          Coags:  Recent Labs     08/15/22  1821   PROT 7.6         Lab Results   Component Value Date    SEDRATE 103 (H) 08/15/2022     Recent Labs     08/15/22  1821   CRP 49.3*         Lower Extremity Physical Exam:  Vascular: DP and PT pulses are palpable on the left, dopplerable on right. CFT <3 seconds to all digits. Hair growth is absent to the level of the digits. Bilateral pitting edema present bilaterally. Neuro: Saph/sural/SP/DP/plantar sensation intact to light touch. No loss of protective sensation noted bilaterally     Musculoskeletal: Muscle strength is 5/5 to all lower extremity muscle groups. Gross deformity is absent. Dermatologic: Full thickness ulcer #1 located at the distal aspect of the right hallux and measures approximately 0.2 cm x 0.2 cm x 1.3 cm. Periwound skin is erythematous and atrophic. Moderate drainage noted with associated mal odor. Erythema present with associated increase in warmth. Lesion probes to bone. Does not sinus track or undermine. No fluctuance, crepitus, or induration. Interdigital maceration absent.      Full-thickness ulcer #2 located to the dorsal medial aspect of the IPJ of the right hallux and measures approximately 0.2 cm x 0.2 cm x 0.4 cm. Mild drainage noted to the site with a malodor. Unclear if lesion probes to bone. Fluctuance present. No crepitus or induration. Clinical Images:            Imaging:   MRI FOOT RIGHT WO CONTRAST   Preliminary Result   Acute osteomyelitis of the entire or near entire 1st proximal and distal   phalanges. Findings consistent with septic arthritis of the 1st IP joint. Plantar ulcer at the distal great toe with the linear fluid-filled tract   extending to the level of the distal phalangeal tuft. VL LOWER EXTREMITY ARTERIAL SEGMENTAL PRESSURES W PPG   Final Result      XR FOOT RIGHT (2 VIEWS)   Final Result   Questionable soft tissue ulceration of the distal aspect of the 1st toe and   erosive changes of the tuft of the 1st distal phalanx compatible with   osteomyelitis. Cultures: MRSA    Assessment   Alan Husain is a 66 y.o. male with   Osteomyelitis, right hallux  Right hallux cellulitis  Diabetic foot ulcer down to bone, right foot  CKD 3  Lower extremity edema bilaterally    Principal Problem:    Cellulitis and abscess of toe of right foot  Active Problems:    Cellulitis of great toe of right foot  Resolved Problems:    * No resolved hospital problems. *       Plan     Patient examined and evaluated at bedside  Treatment options discussed in detail with the patient  MRI confirms osteomyelitis of the right hallux. Aerobic and anaerobic cultures taken, results pending. ID on board. Dressing applied to Right foot: betadine, 4 x 4, Kerlix ACE  WBAT to Right lower extremity  Plan is for OR on 8/19/2022, exact timing TBD, for right hallux amputation.    Patient care discussed with Dr. Cintia Cee, DPM   Podiatric Medicine & Surgery   8/17/2022 at 4:47 PM

## 2022-08-17 NOTE — PROGRESS NOTES
amputations to his feet. Patient denies any other constitutional symptoms. Patient denies nausea, fever, vomiting, chills, SOB at this time. No pain on palpation to right hallux. Right hallux is edematous. Pulses palpable on left, non-palpable on right. Drainage is foul smelling. Probes to bone. Purulent drainage. Aerobic and anaerobic cultures taken. Xrays of the right foot pending  Labs pending    Patient is afebrile. VSS on admission. Patient awake, alert, oriented    CURRENT EVALUATION : 2022    Afebrile  VS stable    Patient stable  No complaints  States he feels well this AM  No new issues per RN  Patient denies nausea, vomiting, fever today    Evaluated by Podiatry  Foot Xray: Poor quality. Possible erosion distal tuft of hallux. MRI foot: Acute osteomyelitis of proximal and distal phalanx of hallux. Possible septic arthritis of first IPJ. Podiatry is planning for hallux amputation, time and day TBD    Cultures:  8/15/2022 blood: No growth x2  8/15/2022 wound: MRSA, light growth    Labs, X rays reviewed: 2022    BUN: 36-->25  Cr: 1.69-->1.46    WBC:11.3-->9.8  Hb:12.2-->11  Plat: 258-->337    Cultures:  Urine:    Blood:  8/15/22: No growth    Sputum :    Wound:  8-15-22: MRSA, light growth    Imagin22: MRI Foot  Impression   Acute osteomyelitis of the entire or near entire 1st proximal and distal   phalanges. Findings consistent with septic arthritis of the 1st IP joint. Plantar ulcer at the distal great toe with the linear fluid-filled tract   extending to the level of the distal phalangeal tuft. 22:            8-15-22:            Discussed with patient, RN, Podiatry. I have personally reviewed the past medical history, past surgical history, medications, social history, and family history, and I have updated the database accordingly. Past Medical History:   No past medical history on file.     Past Surgical  History:   No past surgical history on file.    Medications:      sodium chloride flush  5-40 mL IntraVENous 2 times per day    enoxaparin  40 mg SubCUTAneous Daily    piperacillin-tazobactam  3,375 mg IntraVENous Q8H    vancomycin  1,250 mg IntraVENous Q24H       Social History:     Social History     Socioeconomic History    Marital status:      Spouse name: Not on file    Number of children: Not on file    Years of education: Not on file    Highest education level: Not on file   Occupational History    Not on file   Tobacco Use    Smoking status: Not on file    Smokeless tobacco: Not on file   Substance and Sexual Activity    Alcohol use: Not on file    Drug use: Not on file    Sexual activity: Not on file   Other Topics Concern    Not on file   Social History Narrative    Not on file     Social Determinants of Health     Financial Resource Strain: Not on file   Food Insecurity: Not on file   Transportation Needs: Not on file   Physical Activity: Not on file   Stress: Not on file   Social Connections: Not on file   Intimate Partner Violence: Not on file   Housing Stability: Not on file       Family History:   No family history on file. Allergies:   Patient has no known allergies. Review of Systems:   Constitutional: No fevers or chills. No systemic complaints  Head: No headaches  Eyes: No double vision or blurry vision. No conjunctival inflammation. ENT: No sore throat or runny nose. . No hearing loss, tinnitus or vertigo. Cardiovascular: No chest pain or palpitations. No shortness of breath. No SULLIVAN  Lung: No shortness of breath or cough. No sputum production  Abdomen: No nausea, vomiting, diarrhea, or abdominal pain. Algis Broaden No cramps. Genitourinary: No increased urinary frequency, or dysuria. No hematuria. No suprapubic or CVA pain. CKD3  Musculoskeletal: No muscle aches or pains. No joint effusions, swelling or deformities  Hematologic: No bleeding or bruising. Neurologic: No headache, weakness, numbness, or tingling.   Integument: No rash, Hallux ulcers. Psychiatric: No depression. Endocrine: No polyuria, no polydipsia, no polyphagia. Physical Examination :   Patient Vitals for the past 8 hrs:   BP Temp Temp src Pulse Resp SpO2   08/17/22 0734 (!) 142/74 98.4 °F (36.9 °C) Oral 74 16 96 %     General Appearance: Awake, alert, and in no apparent distress  Head:  Normocephalic, no trauma  Eyes: Pupils equal, round, reactive to light and accommodation; extraocular movements intact; sclera anicteric; conjunctivae pink. No embolic phenomena. ENT: Oropharynx clear, without erythema, exudate, or thrush. No tenderness of sinuses. Mouth/throat: mucosa pink and moist. No lesions. Dentition in good repair. Neck:Supple, without lymphadenopathy. Thyroid normal, No bruits. Pulmonary/Chest: Clear to auscultation, without wheezes, rales, or rhonchi. No dullness to percussion. Cardiovascular: Regular rate and rhythm without murmurs, rubs, or gallops. Poor bloodflow to b/l extremities. Abdomen: Soft, non tender. Bowel sounds normal. No organomegaly  All four Extremities: No cyanosis, clubbing, edema, or effusions. Neurologic: No gross sensory or motor deficits. Skin: Warm and dry with good turgor. Signs of peripheral arterial  insufficiency. Ulceration present to distal right hallux and dorsomedial hallux. Probes to bone. Purulent drainage. Medical Decision Making -Laboratory:   I have independently reviewed/ordered the following labs:    CBC with Differential:   Recent Labs     08/15/22  1821   WBC 9.8   HGB 11.0*   HCT 36.0*      LYMPHOPCT 13*   MONOPCT 9     BMP:   Recent Labs     08/15/22  1821      K 4.3      CO2 18*   BUN 25*   CREATININE 1.46*     Hepatic Function Panel:   Recent Labs     08/15/22  1821   PROT 7.6   LABALBU 3.4*   BILITOT 0.35   ALKPHOS 98   ALT 28   AST 34     No results for input(s): RPR in the last 72 hours. No results for input(s): HIV in the last 72 hours.   No results for input(s): BC in the last 72 hours. Lab Results   Component Value Date/Time    RBC 3.38 08/15/2022 06:21 PM    WBC 9.8 08/15/2022 06:21 PM     Lab Results   Component Value Date/Time    CREATININE 1.46 08/15/2022 06:21 PM    GLUCOSE 97 08/15/2022 06:21 PM       Medical Decision Making-Imaging:     EXAMINATION:   TWO XRAY VIEWS OF THE RIGHT FOOT       8/15/2022 7:28 pm       COMPARISON:   None. HISTORY:   ORDERING SYSTEM PROVIDED HISTORY: cellulitis and abscess   TECHNOLOGIST PROVIDED HISTORY:   cellulitis and abscess       FINDINGS:   Erosive changes of the tuft of the 1st distal phalanx. Diffuse osseous   demineralization. Normal midfoot alignment is grossly maintained. No   fracture or dislocation. Questionable soft tissue ulceration at the distal   aspect of the 1st toe with soft tissue swelling. Impression   Questionable soft tissue ulceration of the distal aspect of the 1st toe and   erosive changes of the tuft of the 1st distal phalanx compatible with   osteomyelitis. Medical Decision [id number]:       Medical Decision Making-Other:     Note:  Labs, medications, radiologic studies were reviewed with personal review of films  Moderate Large amounts of data were reviewed  Discussed with nursing Staff, Discharge planner  Infection Control and Prevention measures reviewed  All prior entries were reviewed  Administer medications as ordered  Prognosis: [address]  Discharge planning reviewed  Follow up as outpatient. Thank you for allowing us to participate in the care of this patient. Please call with questions.         Eileen Sethi MD.      Pager: [phone] - Office: [phone]

## 2022-08-18 ENCOUNTER — ANESTHESIA EVENT (OUTPATIENT)
Dept: OPERATING ROOM | Age: 72
DRG: 475 | End: 2022-08-18
Payer: MEDICARE

## 2022-08-18 ENCOUNTER — ANESTHESIA (OUTPATIENT)
Dept: OPERATING ROOM | Age: 72
DRG: 475 | End: 2022-08-18
Payer: MEDICARE

## 2022-08-18 LAB — VANCOMYCIN RANDOM: 23.3 UG/ML

## 2022-08-18 PROCEDURE — 3700000001 HC ADD 15 MINUTES (ANESTHESIA): Performed by: PODIATRIST

## 2022-08-18 PROCEDURE — 6370000000 HC RX 637 (ALT 250 FOR IP)

## 2022-08-18 PROCEDURE — A4216 STERILE WATER/SALINE, 10 ML: HCPCS | Performed by: NURSE ANESTHETIST, CERTIFIED REGISTERED

## 2022-08-18 PROCEDURE — 2580000003 HC RX 258

## 2022-08-18 PROCEDURE — 88311 DECALCIFY TISSUE: CPT

## 2022-08-18 PROCEDURE — 3600000004 HC SURGERY LEVEL 4 BASE: Performed by: PODIATRIST

## 2022-08-18 PROCEDURE — 99232 SBSQ HOSP IP/OBS MODERATE 35: CPT | Performed by: INTERNAL MEDICINE

## 2022-08-18 PROCEDURE — 6370000000 HC RX 637 (ALT 250 FOR IP): Performed by: INTERNAL MEDICINE

## 2022-08-18 PROCEDURE — 2500000003 HC RX 250 WO HCPCS: Performed by: NURSE ANESTHETIST, CERTIFIED REGISTERED

## 2022-08-18 PROCEDURE — 87075 CULTR BACTERIA EXCEPT BLOOD: CPT

## 2022-08-18 PROCEDURE — 6360000002 HC RX W HCPCS

## 2022-08-18 PROCEDURE — 80202 ASSAY OF VANCOMYCIN: CPT

## 2022-08-18 PROCEDURE — 0Y6M0Z9 DETACHMENT AT RIGHT FOOT, PARTIAL 1ST RAY, OPEN APPROACH: ICD-10-PCS | Performed by: PODIATRIST

## 2022-08-18 PROCEDURE — 6360000002 HC RX W HCPCS: Performed by: NURSE ANESTHETIST, CERTIFIED REGISTERED

## 2022-08-18 PROCEDURE — 87186 SC STD MICRODIL/AGAR DIL: CPT

## 2022-08-18 PROCEDURE — 2500000003 HC RX 250 WO HCPCS: Performed by: PODIATRIST

## 2022-08-18 PROCEDURE — 2709999900 HC NON-CHARGEABLE SUPPLY: Performed by: PODIATRIST

## 2022-08-18 PROCEDURE — 87176 TISSUE HOMOGENIZATION CULTR: CPT

## 2022-08-18 PROCEDURE — 3600000014 HC SURGERY LEVEL 4 ADDTL 15MIN: Performed by: PODIATRIST

## 2022-08-18 PROCEDURE — 36415 COLL VENOUS BLD VENIPUNCTURE: CPT

## 2022-08-18 PROCEDURE — 2580000003 HC RX 258: Performed by: NURSE ANESTHETIST, CERTIFIED REGISTERED

## 2022-08-18 PROCEDURE — 87076 CULTURE ANAEROBE IDENT EACH: CPT

## 2022-08-18 PROCEDURE — 7100000001 HC PACU RECOVERY - ADDTL 15 MIN: Performed by: PODIATRIST

## 2022-08-18 PROCEDURE — 86403 PARTICLE AGGLUT ANTBDY SCRN: CPT

## 2022-08-18 PROCEDURE — 87185 SC STD ENZYME DETCJ PER NZM: CPT

## 2022-08-18 PROCEDURE — 7100000000 HC PACU RECOVERY - FIRST 15 MIN: Performed by: PODIATRIST

## 2022-08-18 PROCEDURE — 88305 TISSUE EXAM BY PATHOLOGIST: CPT

## 2022-08-18 PROCEDURE — 1200000000 HC SEMI PRIVATE

## 2022-08-18 PROCEDURE — 87205 SMEAR GRAM STAIN: CPT

## 2022-08-18 PROCEDURE — 87070 CULTURE OTHR SPECIMN AEROBIC: CPT

## 2022-08-18 PROCEDURE — 3700000000 HC ANESTHESIA ATTENDED CARE: Performed by: PODIATRIST

## 2022-08-18 PROCEDURE — 2580000003 HC RX 258: Performed by: INTERNAL MEDICINE

## 2022-08-18 RX ORDER — BUPIVACAINE HYDROCHLORIDE 5 MG/ML
INJECTION, SOLUTION EPIDURAL; INTRACAUDAL PRN
Status: DISCONTINUED | OUTPATIENT
Start: 2022-08-18 | End: 2022-08-18 | Stop reason: ALTCHOICE

## 2022-08-18 RX ORDER — SODIUM CHLORIDE 9 MG/ML
25 INJECTION, SOLUTION INTRAVENOUS PRN
Status: DISCONTINUED | OUTPATIENT
Start: 2022-08-18 | End: 2022-08-18 | Stop reason: HOSPADM

## 2022-08-18 RX ORDER — LIDOCAINE HYDROCHLORIDE 10 MG/ML
INJECTION, SOLUTION EPIDURAL; INFILTRATION; INTRACAUDAL; PERINEURAL PRN
Status: DISCONTINUED | OUTPATIENT
Start: 2022-08-18 | End: 2022-08-18 | Stop reason: ALTCHOICE

## 2022-08-18 RX ORDER — METOCLOPRAMIDE HYDROCHLORIDE 5 MG/ML
10 INJECTION INTRAMUSCULAR; INTRAVENOUS
Status: DISCONTINUED | OUTPATIENT
Start: 2022-08-18 | End: 2022-08-18 | Stop reason: HOSPADM

## 2022-08-18 RX ORDER — LIDOCAINE HYDROCHLORIDE 10 MG/ML
INJECTION, SOLUTION EPIDURAL; INFILTRATION; INTRACAUDAL; PERINEURAL PRN
Status: DISCONTINUED | OUTPATIENT
Start: 2022-08-18 | End: 2022-08-18 | Stop reason: SDUPTHER

## 2022-08-18 RX ORDER — DROPERIDOL 2.5 MG/ML
0.62 INJECTION, SOLUTION INTRAMUSCULAR; INTRAVENOUS
Status: DISCONTINUED | OUTPATIENT
Start: 2022-08-18 | End: 2022-08-18 | Stop reason: HOSPADM

## 2022-08-18 RX ORDER — SODIUM CHLORIDE 0.9 % (FLUSH) 0.9 %
5-40 SYRINGE (ML) INJECTION EVERY 12 HOURS SCHEDULED
Status: DISCONTINUED | OUTPATIENT
Start: 2022-08-18 | End: 2022-08-18 | Stop reason: HOSPADM

## 2022-08-18 RX ORDER — METRONIDAZOLE 500 MG/1
500 TABLET ORAL EVERY 8 HOURS SCHEDULED
Status: DISCONTINUED | OUTPATIENT
Start: 2022-08-18 | End: 2022-08-20 | Stop reason: HOSPADM

## 2022-08-18 RX ORDER — FENTANYL CITRATE 50 UG/ML
INJECTION, SOLUTION INTRAMUSCULAR; INTRAVENOUS PRN
Status: DISCONTINUED | OUTPATIENT
Start: 2022-08-18 | End: 2022-08-18 | Stop reason: SDUPTHER

## 2022-08-18 RX ORDER — DIPHENHYDRAMINE HYDROCHLORIDE 50 MG/ML
12.5 INJECTION INTRAMUSCULAR; INTRAVENOUS
Status: DISCONTINUED | OUTPATIENT
Start: 2022-08-18 | End: 2022-08-18 | Stop reason: HOSPADM

## 2022-08-18 RX ORDER — ONDANSETRON 2 MG/ML
INJECTION INTRAMUSCULAR; INTRAVENOUS PRN
Status: DISCONTINUED | OUTPATIENT
Start: 2022-08-18 | End: 2022-08-18 | Stop reason: SDUPTHER

## 2022-08-18 RX ORDER — PROPOFOL 10 MG/ML
INJECTION, EMULSION INTRAVENOUS PRN
Status: DISCONTINUED | OUTPATIENT
Start: 2022-08-18 | End: 2022-08-18 | Stop reason: SDUPTHER

## 2022-08-18 RX ORDER — DEXAMETHASONE SODIUM PHOSPHATE 10 MG/ML
INJECTION INTRAMUSCULAR; INTRAVENOUS PRN
Status: DISCONTINUED | OUTPATIENT
Start: 2022-08-18 | End: 2022-08-18 | Stop reason: SDUPTHER

## 2022-08-18 RX ORDER — SODIUM CHLORIDE 9 MG/ML
INJECTION INTRAVENOUS PRN
Status: DISCONTINUED | OUTPATIENT
Start: 2022-08-18 | End: 2022-08-18 | Stop reason: SDUPTHER

## 2022-08-18 RX ORDER — HYDRALAZINE HYDROCHLORIDE 20 MG/ML
10 INJECTION INTRAMUSCULAR; INTRAVENOUS
Status: DISCONTINUED | OUTPATIENT
Start: 2022-08-18 | End: 2022-08-18 | Stop reason: HOSPADM

## 2022-08-18 RX ORDER — PHENYLEPHRINE HYDROCHLORIDE 10 MG/ML
INJECTION INTRAVENOUS PRN
Status: DISCONTINUED | OUTPATIENT
Start: 2022-08-18 | End: 2022-08-18 | Stop reason: SDUPTHER

## 2022-08-18 RX ORDER — SODIUM CHLORIDE, SODIUM LACTATE, POTASSIUM CHLORIDE, CALCIUM CHLORIDE 600; 310; 30; 20 MG/100ML; MG/100ML; MG/100ML; MG/100ML
INJECTION, SOLUTION INTRAVENOUS CONTINUOUS PRN
Status: DISCONTINUED | OUTPATIENT
Start: 2022-08-18 | End: 2022-08-18 | Stop reason: SDUPTHER

## 2022-08-18 RX ORDER — SODIUM CHLORIDE 0.9 % (FLUSH) 0.9 %
5-40 SYRINGE (ML) INJECTION PRN
Status: DISCONTINUED | OUTPATIENT
Start: 2022-08-18 | End: 2022-08-18 | Stop reason: HOSPADM

## 2022-08-18 RX ADMIN — METRONIDAZOLE 500 MG: 500 TABLET, FILM COATED ORAL at 22:53

## 2022-08-18 RX ADMIN — LIDOCAINE HYDROCHLORIDE 50 MG: 10 INJECTION, SOLUTION EPIDURAL; INFILTRATION; INTRACAUDAL; PERINEURAL at 21:04

## 2022-08-18 RX ADMIN — SODIUM CHLORIDE, PRESERVATIVE FREE 10 ML: 5 INJECTION INTRAVENOUS at 10:00

## 2022-08-18 RX ADMIN — ONDANSETRON 4 MG: 2 INJECTION INTRAMUSCULAR; INTRAVENOUS at 21:44

## 2022-08-18 RX ADMIN — PHENYLEPHRINE HYDROCHLORIDE 150 MCG: 10 INJECTION INTRAVENOUS at 21:30

## 2022-08-18 RX ADMIN — FENTANYL CITRATE 50 MCG: 50 INJECTION, SOLUTION INTRAMUSCULAR; INTRAVENOUS at 21:01

## 2022-08-18 RX ADMIN — PHENYLEPHRINE HYDROCHLORIDE 150 MCG: 10 INJECTION INTRAVENOUS at 21:17

## 2022-08-18 RX ADMIN — VANCOMYCIN HYDROCHLORIDE 1000 MG: 10 INJECTION, POWDER, LYOPHILIZED, FOR SOLUTION INTRAVENOUS at 22:52

## 2022-08-18 RX ADMIN — DEXAMETHASONE SODIUM PHOSPHATE 10 MG: 10 INJECTION INTRAMUSCULAR; INTRAVENOUS at 21:44

## 2022-08-18 RX ADMIN — PHENYLEPHRINE HYDROCHLORIDE 150 MCG: 10 INJECTION INTRAVENOUS at 21:41

## 2022-08-18 RX ADMIN — PROPOFOL 140 MG: 10 INJECTION, EMULSION INTRAVENOUS at 21:04

## 2022-08-18 RX ADMIN — SODIUM CHLORIDE, POTASSIUM CHLORIDE, SODIUM LACTATE AND CALCIUM CHLORIDE: 600; 310; 30; 20 INJECTION, SOLUTION INTRAVENOUS at 21:01

## 2022-08-18 RX ADMIN — SODIUM CHLORIDE, PRESERVATIVE FREE 10 ML: 5 INJECTION INTRAVENOUS at 20:10

## 2022-08-18 RX ADMIN — SODIUM CHLORIDE 10 ML: 9 INJECTION, SOLUTION INTRAMUSCULAR; INTRAVENOUS; SUBCUTANEOUS at 21:17

## 2022-08-18 RX ADMIN — ACETAMINOPHEN 650 MG: 325 TABLET ORAL at 07:31

## 2022-08-18 RX ADMIN — METRONIDAZOLE 500 MG: 500 TABLET, FILM COATED ORAL at 17:26

## 2022-08-18 ASSESSMENT — PAIN SCALES - GENERAL
PAINLEVEL_OUTOF10: 7
PAINLEVEL_OUTOF10: 0

## 2022-08-18 NOTE — CARE COORDINATION
Transitional Planning:  Call from Isael Figueroa at [facility]. Pt is covered 100% but they cannot accept any referrals that are less that one week. Spoke with pt re: home care choice. Pt unsure if he has teachable caregiver for infusion. Agreeable to coming to infusion center for IV ABX. Spoke with [place] at infusion center. They would need to set up appointment on day of discharge for following day and need RX faxed.

## 2022-08-18 NOTE — PROGRESS NOTES
[facility] - Progress Note    Today's Date and Time: 8/18/2022, 10:45 AM    Impression :   Cellulitis and abscess of right hallux. MRSA  CKD3 with Cr Clearance ~ 40 cc/min  CAD with prior stents  Peripheral arterial disease    Recommendations:   D/C Zosyn 3.3 gm IV q 8 hr  Vancomycin 1250 mg q 24 hr. Continue for a few days post toe amputation on 8-18-22. Toe amputation at the discretion of [address] Making/Summary/Discussion:8/18/2022       Infection Control Recommendations   [place] Precautions  Contact Isolation       Antimicrobial Stewardship Recommendations     Simplification of therapy  Targeted therapy  PK dosing    Coordination of Outpatient Care:   Estimated Length of IV antimicrobials:TBD  Patient will need Midline Catheter Insertion: TBD  Patient will need PICC line Insertion:  Patient will need: Home IV , [place],  SNF,  LTAC: TBD  Patient will need outpatient wound care:Yes    Chief complaint/reason for consultation:   Cellulitis of right hallux      History of Present Illness:   Sharon Giron is a 66y.o.-year-old  male who was initially admitted on 8/15/2022. Patient seen at the request of Dr. Little Russ. INITIAL HISTORY:  Patient was sent to the hospital today for admission by his PCP. Patient  has a history of CKD stage 3 and hypertension. He is not diabetic, is not on immunosuppressive meds, and has no history of trauma or surgery to the right foot. Patient states his toe has been swelling intermittently for one month and he first noticed drainage 2 weeks ago. Patient has been applying dressings at home with antibiotic ointments. Patient has not treated with any other methods. Patient relates minor tenderness to the site with minimal drainage. Drainage noted to distoplantar aspect of hallux as well as dorsal medial aspect over the IPJ.   Patient has no previous history of cellulitis, ulcerations, or amputations to his feet. Patient denies any other constitutional symptoms. Patient denies nausea, fever, vomiting, chills, SOB at this time. No pain on palpation to right hallux. Right hallux is edematous. Pulses palpable on left, non-palpable on right. Drainage is foul smelling. Probes to bone. Purulent drainage. Aerobic and anaerobic cultures taken. Xrays of the right foot pending  Labs pending    Patient is afebrile. VSS on admission. Patient awake, alert, oriented    CURRENT EVALUATION : 2022    Afebrile  VS stable    Patient feels better  No complaints  No new issues per RN    Patient denies nausea, vomiting, fever today    Evaluated by Podiatry  Foot Xray: Poor quality. Possible erosion distal tuft of hallux. MRI foot: Acute osteomyelitis of proximal and distal phalanx of hallux. Possible septic arthritis of first IPJ. Podiatry is planning for hallux amputation either 22 or 22    Cultures:  8/15/2022 blood: No growth x2  8/15/2022 wound: MRSA, light growth    Labs, X rays reviewed: 2022    BUN: 36-->25  Cr: 1.69-->1.46    WBC:11.3-->9.8  Hb:12.2-->11  Plat: 258-->337    Cultures:  Urine:    Blood:  8/15/22: No growth    Sputum :    Wound:  8-15-22: MRSA, light growth    Imagin22: MRI Foot  Impression   Acute osteomyelitis of the entire or near entire 1st proximal and distal   phalanges. Findings consistent with septic arthritis of the 1st IP joint. Plantar ulcer at the distal great toe with the linear fluid-filled tract   extending to the level of the distal phalangeal tuft. 22:            8-15-22:            Discussed with patient, RN, Podiatry. I have personally reviewed the past medical history, past surgical history, medications, social history, and family history, and I have updated the database accordingly. Past Medical History:   No past medical history on file.     Past Surgical  History:   No past surgical history on file.    Medications:      vancomycin  1,250 mg IntraVENous Q24H    sodium chloride flush  5-40 mL IntraVENous 2 times per day    enoxaparin  40 mg SubCUTAneous Daily       Social History:     Social History     Socioeconomic History    Marital status:      Spouse name: Not on file    Number of children: Not on file    Years of education: Not on file    Highest education level: Not on file   Occupational History    Not on file   Tobacco Use    Smoking status: Not on file    Smokeless tobacco: Not on file   Substance and Sexual Activity    Alcohol use: Not on file    Drug use: Not on file    Sexual activity: Not on file   Other Topics Concern    Not on file   Social History Narrative    Not on file     Social Determinants of Health     Financial Resource Strain: Not on file   Food Insecurity: Not on file   Transportation Needs: Not on file   Physical Activity: Not on file   Stress: Not on file   Social Connections: Not on file   Intimate Partner Violence: Not on file   Housing Stability: Not on file       Family History:   No family history on file. Allergies:   Patient has no known allergies. Review of Systems:   Constitutional: No fevers or chills. No systemic complaints  Head: No headaches  Eyes: No double vision or blurry vision. No conjunctival inflammation. ENT: No sore throat or runny nose. . No hearing loss, tinnitus or vertigo. Cardiovascular: No chest pain or palpitations. No shortness of breath. No SULLIVAN  Lung: No shortness of breath or cough. No sputum production  Abdomen: No nausea, vomiting, diarrhea, or abdominal pain. Dodge Chime No cramps. Genitourinary: No increased urinary frequency, or dysuria. No hematuria. No suprapubic or CVA pain. CKD3  Musculoskeletal: No muscle aches or pains. No joint effusions, swelling or deformities  Hematologic: No bleeding or bruising. Neurologic: No headache, weakness, numbness, or tingling. Integument: No rash, Hallux ulcers. Psychiatric: No depression. Endocrine: No polyuria, no polydipsia, no polyphagia. Physical Examination :   Patient Vitals for the past 8 hrs:   BP Temp Temp src Pulse Resp SpO2   08/18/22 0748 (!) 144/64 98.1 °F (36.7 °C) Oral 71 16 97 %       General Appearance: Awake, alert, and in no apparent distress  Head:  Normocephalic, no trauma  Eyes: Pupils equal, round, reactive to light and accommodation; extraocular movements intact; sclera anicteric; conjunctivae pink. No embolic phenomena. ENT: Oropharynx clear, without erythema, exudate, or thrush. No tenderness of sinuses. Mouth/throat: mucosa pink and moist. No lesions. Dentition in good repair. Neck:Supple, without lymphadenopathy. Thyroid normal, No bruits. Pulmonary/Chest: Clear to auscultation, without wheezes, rales, or rhonchi. No dullness to percussion. Cardiovascular: Regular rate and rhythm without murmurs, rubs, or gallops. Poor bloodflow to b/l extremities. Abdomen: Soft, non tender. Bowel sounds normal. No organomegaly  All four Extremities: No cyanosis, clubbing, edema, or effusions. Neurologic: No gross sensory or motor deficits. Skin: Warm and dry with good turgor. Signs of peripheral arterial  insufficiency. Ulceration present to distal right hallux and dorsomedial hallux. Probes to bone. Purulent drainage. Medical Decision Making -Laboratory:   I have independently reviewed/ordered the following labs:    CBC with Differential:   Recent Labs     08/15/22  1821   WBC 9.8   HGB 11.0*   HCT 36.0*      LYMPHOPCT 13*   MONOPCT 9       BMP:   Recent Labs     08/15/22  1821      K 4.3      CO2 18*   BUN 25*   CREATININE 1.46*       Hepatic Function Panel:   Recent Labs     08/15/22  1821   PROT 7.6   LABALBU 3.4*   BILITOT 0.35   ALKPHOS 98   ALT 28   AST 34       No results for input(s): RPR in the last 72 hours. No results for input(s): HIV in the last 72 hours. No results for input(s): BC in the last 72 hours.   Lab Results   Component Value Date/Time    RBC 3.38 08/15/2022 06:21 PM    WBC 9.8 08/15/2022 06:21 PM     Lab Results   Component Value Date/Time    CREATININE 1.46 08/15/2022 06:21 PM    GLUCOSE 97 08/15/2022 06:21 PM       Medical Decision Making-Imaging:     EXAMINATION:   TWO XRAY VIEWS OF THE RIGHT FOOT       8/15/2022 7:28 pm       COMPARISON:   None. HISTORY:   ORDERING SYSTEM PROVIDED HISTORY: cellulitis and abscess   TECHNOLOGIST PROVIDED HISTORY:   cellulitis and abscess       FINDINGS:   Erosive changes of the tuft of the 1st distal phalanx. Diffuse osseous   demineralization. Normal midfoot alignment is grossly maintained. No   fracture or dislocation. Questionable soft tissue ulceration at the distal   aspect of the 1st toe with soft tissue swelling. Impression   Questionable soft tissue ulceration of the distal aspect of the 1st toe and   erosive changes of the tuft of the 1st distal phalanx compatible with   osteomyelitis. Medical Decision [id number]:       Medical Decision Making-Other:     Note:  Labs, medications, radiologic studies were reviewed with personal review of films  Moderate Large amounts of data were reviewed  Discussed with nursing Staff, Discharge planner  Infection Control and Prevention measures reviewed  All prior entries were reviewed  Administer medications as ordered  Prognosis: [address]  Discharge planning reviewed  Follow up as outpatient. Thank you for allowing us to participate in the care of this patient. Please call with questions.         Jayna Jason MD.      Pager: [phone] - Office: [phone]

## 2022-08-18 NOTE — PROGRESS NOTES
Progress Note  Podiatric Medicine and Surgery     Subjective     CC: Right hallux cellulitis    - Patient seen and examined at bedside. Patient resting comfortably in his bed.  - Denies any changes overnight no acute events overnight. - Afebrile, vital signs stable       HPI :  Dano Kruger is a 66 y.o. male seen at [facility] for right hallux cellulitis and possible abscess. Patient is not a diabetic. Patient has a history of CKD 3, hypertension. Patient was sent in by his PCP today. Patient does not follow a podiatrist.  Patient states for the last month his toe has been swelling intermittently. There is an ulceration at the distal aspect of the right hallux and at the dorsomedial aspect over the IPJ of the right hallux. Both are draining purulent fluid. Patient denies numbness bilaterally. He noticed draining about 2 weeks ago. He has been wrapping it with antibiotic ointment at home. Patient denies a history of cellulitis, ulcerations, or amputations to his feet bilaterally. Patient denies any previous surgery to his feet bilaterally. Patient admits to poor blood flow to feet bilaterally. Patient denies any other constitutional symptoms at this time including nausea, fever, vomiting, chills, shortness of breath. Has noticed mild drainage or blood coming from the site. Patient is afebrile at this time and vital signs are stable. Patient is awake alert and oriented. Denies any other pedal complaints at this time. PCP is No primary care provider on file. ROS: Denies N/V/F/C/SOB/CP. Otherwise negative except at stated in the HPI.      Medications:  Scheduled Meds:   vancomycin  1,000 mg IntraVENous Q24H    sodium chloride flush  5-40 mL IntraVENous 2 times per day    enoxaparin  40 mg SubCUTAneous Daily       Continuous Infusions:   sodium chloride 5 mL/hr at 08/16/22 0049       PRN Meds:sodium chloride flush, sodium chloride, ondansetron **OR** ondansetron, polyethylene glycol, acetaminophen **OR** acetaminophen    Objective     Vitals:  Patient Vitals for the past 8 hrs:   BP Temp Temp src Pulse Resp SpO2 Height   22 0748 (!) 144/64 98.1 °F (36.7 °C) Oral 71 16 97 % 6' (1.829 m)     Average, Min, and Max for last 24 hours Vitals:  TEMPERATURE:  Temp  Av °F (36.7 °C)  Min: 97.8 °F (36.6 °C)  Max: 98.1 °F (36.7 °C)    RESPIRATIONS RANGE: Resp  Av  Min: 16  Max: 16    PULSE RANGE: Pulse  Av  Min: 71  Max: 71    BLOOD PRESSURE RANGE:  Systolic (45PDC), LEON:561 , Min:127 , NRV:777   ; Diastolic (29HOT), DBW:96, Min:64, Max:64      PULSE OXIMETRY RANGE: SpO2  Av %  Min: 95 %  Max: 97 %    I/O last 3 completed shifts:  In: -   Out: 500 [Urine:500]    CBC:  Recent Labs     08/15/22  1821   WBC 9.8   HGB 11.0*   HCT 36.0*      CRP 49.3*        BMP:  Recent Labs     08/15/22  1821      K 4.3      CO2 18*   BUN 25*   CREATININE 1.46*   GLUCOSE 97   CALCIUM 9.4        Coags:  Recent Labs     08/15/22  1821   PROT 7.6       Lab Results   Component Value Date    SEDRATE 103 (H) 08/15/2022     Recent Labs     08/15/22  182   CRP 49.3*       Lower Extremity Physical Exam:  Vascular: DP and PT pulses are palpable on the left, dopplerable on right. CFT <3 seconds to all digits. Hair growth is absent to the level of the digits. Bilateral pitting edema present bilaterally. Neuro: Saph/sural/SP/DP/plantar sensation intact to light touch. No loss of protective sensation noted bilaterally     Musculoskeletal: Muscle strength is 5/5 to all lower extremity muscle groups. Gross deformity is absent. Dermatologic: Full thickness ulcer #1 located at the distal aspect of the right hallux and measures approximately 0.2 cm x 0.2 cm x 1.3 cm. Periwound skin is erythematous and atrophic. Moderate drainage noted with associated mal odor. Erythema present with associated increase in warmth. Lesion probes to bone. Does not sinus track or undermine.  No fluctuance, crepitus, or induration. Interdigital maceration absent. Full-thickness ulcer #2 located to the dorsal medial aspect of the IPJ of the right hallux and measures approximately 0.2 cm x 0.2 cm x 0.4 cm. Mild drainage noted to the site with a malodor. Unclear if lesion probes to bone. Fluctuance present. No crepitus or induration. Clinical Images:            Imaging:   MRI FOOT RIGHT WO CONTRAST   Final Result   Acute osteomyelitis of the entire or near entire 1st proximal and distal   phalanges. Findings consistent with septic arthritis of the 1st IP joint. Plantar ulcer at the distal great toe with the linear fluid-filled tract   extending to the level of the distal phalangeal tuft. VL LOWER EXTREMITY ARTERIAL SEGMENTAL PRESSURES W PPG   Final Result      XR FOOT RIGHT (2 VIEWS)   Final Result   Questionable soft tissue ulceration of the distal aspect of the 1st toe and   erosive changes of the tuft of the 1st distal phalanx compatible with   osteomyelitis. VASCULAR REPORT    (Results Pending)       Cultures: MRSA, Bacteroides fragilis    Assessment   Mady Medina is a 66 y.o. male with   Osteomyelitis, right hallux  Right hallux cellulitis  Diabetic foot ulcer down to bone, right foot  CKD 3  Lower extremity edema bilaterally    Principal Problem:    Cellulitis and abscess of toe of right foot  Active Problems:    Cellulitis of great toe of right foot  Resolved Problems:    * No resolved hospital problems. *       Plan     Patient examined and evaluated at bedside  Treatment options discussed in detail with the patient  MRI confirms osteomyelitis of the right hallux. Aerobic and anaerobic cultures taken  MRSA, light growth  Bacteroides fragilis, beta-lactamase positive, light growth  ID on board. Zosyn discontinued. Continue Vancomycin. Added Flagyl TID for anaerobic coverage.    Dressing applied to Right foot: betadine, 4 x 4, Kerlix ACE  WBAT to Right lower extremity  Plan is for OR this evening 8/18/2022, exact timing TBD, for right hallux amputation. Patient care discussed with Dr. Roscoe Mcmanus.     Lucila Garcia DPM   Podiatric Medicine & Surgery   8/18/2022 at 2:58 PM

## 2022-08-18 NOTE — PROGRESS NOTES
Elisa Saldivar MD,MD      PCP: No primary care provider on file. 8/17/2022  8:26 PM    Admitting Diagnosis:  Cellulitis and abscess of toe of right foot    Problem List:  Active Hospital Problems    Diagnosis Date Noted    Cellulitis of great toe of right foot [L03.031] 08/15/2022     Priority: Medium    Cellulitis and abscess of toe of right foot [L03.031, L02.611] 08/15/2022     Priority: Medium       Subjective: the patient denies any new complaints,. He states he's awaiting a podiatrist that is on his insurance in order to proceed with the right great toe amputation. He currently denies any fevers or chills,. He also states that his pain is under control.     Allergies:  No Known Allergies    Current Medications:  vancomycin (VANCOCIN) 1250 mg in sodium chloride 0.9% 250 mL IVPB, Q24H  sodium chloride flush 0.9 % injection 5-40 mL, 2 times per day  sodium chloride flush 0.9 % injection 5-40 mL, PRN  0.9 % sodium chloride infusion, PRN  enoxaparin (LOVENOX) injection 40 mg, Daily  ondansetron (ZOFRAN-ODT) disintegrating tablet 4 mg, Q8H PRN   Or  ondansetron (ZOFRAN) injection 4 mg, Q6H PRN  polyethylene glycol (GLYCOLAX) packet 17 g, Daily PRN  acetaminophen (TYLENOL) tablet 650 mg, Q6H PRN   Or  acetaminophen (TYLENOL) suppository 650 mg, Q6H PRN        Physical Examination:  /64   Pulse 71   Temp 97.8 °F (36.6 °C) (Oral)   Resp 16   Wt 195 lb (88.5 kg)   SpO2 95%     General appearance - alert, well appearing, and in no distress  Mental status - alert, oriented to person, place, and time  Eyes - pupils equal and reactive, extraocular eye movements intact  Nose - normal and patent, no erythema, discharge or polyps  Mouth - mucous membranes moist, pharynx normal without lesions  Neck - supple, no significant adenopathy  Lymphatics - no palpable lymphadenopathy, no hepatosplenomegaly  Chest - clear to auscultation, no wheezes, rales or rhonchi, symmetric air entry  Heart - normal rate and regular rhythm, S1 and S2 normal, systolic murmur 3/6 at 2nd right intercostal space and radiates to carotids  Abdomen - soft, nontender, nondistended, no masses or organomegaly  Back exam - full range of motion, no tenderness, palpable spasm or pain on motion  Neurological - alert, oriented, normal speech, no focal findings or movement disorder noted  Musculoskeletal - the left foot is bandaged  Extremities - 1+ edema in the left foot, no edema in the right lower extremity  Skin - see . probinfectious disease of pictures of the right foot     Labs:  Hematology:  Recent Labs     08/15/22  1821   WBC 9.8   HGB 11.0*   HCT 36.0*        Chemistry:  Recent Labs     08/15/22  1821      K 4.3      CO2 18*   GLUCOSE 97   BUN 25*   CREATININE 1.46*   CALCIUM 9.4     Glucose:  Recent Labs     08/15/22  1821   GLUCOSE 97     HgBA1c:  No results found for: LABA1C      Plan:  Patient Active Problem List   Diagnosis    Cellulitis of great toe of right foot    Cellulitis and abscess of toe of right foot    #1 cellulitis and abscess of the right great toe- the patient had an MRI which was consistent with osteomyelitis,. The patient is preparing for the amputation of the right great toe. Continue with some IV antibiotics per the infectious disease specialist. #2essential hypertension-the patient blood pressures under good control. No need for antihypertensive medications at this time. #3 aortic stenosis- I'll continue to monitor a physical exam. #4 chronic kidney disease 3a- continue to monitor BUN and creatinine. Medications orders and labs are reviewed. Input from nursing staff was noted and appreciated. Continue current plan of care.     Electronically signed by Zakia Portillo MD on 8/17/2022 at 8:26 PM

## 2022-08-18 NOTE — PROGRESS NOTES
[facility] Pharmacokinetic Monitoring Service - Vancomycin    Consulting Provider: Dr Carbone   Indication: cellulitis Target Concentration: Goal AUC/VIRAJ 400-600 mg*hr/L  Day of Therapy: 3  Additional Antimicrobials:     Pertinent Laboratory Values: Wt Readings from Last 1 Encounters:   08/16/22 195 lb (88.5 kg)     Temp Readings from Last 1 Encounters:   08/18/22 98.1 °F (36.7 °C) (Oral)     Estimated Creatinine Clearance: 49 mL/min (A) (based on SCr of 1.46 mg/dL (H)).   Recent Labs     08/15/22  1821   CREATININE 1.46*   WBC 9.8     Procalcitonin:     Pertinent Cultures:  Culture Date Source Results   8/18 Foot  MRSA BECTEROID BETA LACTAMASE    MRSA Nasal Swab:     Recent vancomycin administrations                     vancomycin (VANCOCIN) 1250 mg in sodium chloride 0.9% 250 mL IVPB (mg) 1,250 mg New Bag 08/17/22 2130    vancomycin (VANCOCIN) 1250 mg in sodium chloride 0.9% 250 mL IVPB (mg) 1,250 mg New Bag 08/16/22 2200     1,250 mg New Bag  0055                    Assessment:  Date/Time Current Dose Concentration Timing of Concentration (h) AUC   8/18 1250 mg q 24 hrs  23.3  11 hr post dose  596   Note: Serum concentrations collected for AUC dosing may appear elevated if collected in close proximity to the dose administered, this is not necessarily an indication of toxicity    Plan:  Current dosing regimen is therapeutic  Due to renal dysfunction history, I will reduce dose to 1000 mg q 24 hrs predicting AUC  AND TROUGH OF 15 AT [address] [facility]   Repeat vancomycin concentration NOT ordered   Pharmacy will continue to monitor patient and adjust therapy as indicated    Thank you for the consult,  Rachel Fontenot, [address] [facility]  8/18/2022 2:03 PM

## 2022-08-18 NOTE — PLAN OF CARE
Problem: Pain  Goal: Verbalizes/displays adequate comfort level or baseline comfort level  8/18/2022 1820 by Stepan Mosquera RN  Outcome: Progressing  8/18/2022 0515 by Skinny Le RN  Outcome: Progressing

## 2022-08-18 NOTE — ANESTHESIA PRE PROCEDURE
L02.611       Past Medical History:  No past medical history on file. Past Surgical History:  No past surgical history on file. Social History:    Social History     Tobacco Use    Smoking status: Not on file    Smokeless tobacco: Not on file   Substance Use Topics    Alcohol use: Not on file                                Counseling given: Not Answered      Vital Signs (Current):   Vitals:    08/16/22 1831 08/17/22 0734 08/17/22 2009 08/18/22 0748   BP: 139/69 (!) 142/74 127/64 (!) 144/64   Pulse: 77 74 71 71   Resp: 15 16  16   Temp: 98.2 °F (36.8 °C) 98.4 °F (36.9 °C) 97.8 °F (36.6 °C) 98.1 °F (36.7 °C)   TempSrc: Oral Oral Oral Oral   SpO2: 97% 96% 95% 97%   Weight:       Height:    6' (1.829 m)                                              BP Readings from Last 3 Encounters:   08/18/22 (!) 144/64       NPO Status:                                                                                 BMI:   Wt Readings from Last 3 Encounters:   08/16/22 195 lb (88.5 kg)     Body mass index is 26.45 kg/m².     CBC:   Lab Results   Component Value Date/Time    WBC 9.8 08/15/2022 06:21 PM    RBC 3.38 08/15/2022 06:21 PM    HGB 11.0 08/15/2022 06:21 PM    HCT 36.0 08/15/2022 06:21 PM    .5 08/15/2022 06:21 PM    RDW 12.8 08/15/2022 06:21 PM     08/15/2022 06:21 PM       CMP:   Lab Results   Component Value Date/Time     08/15/2022 06:21 PM    K 4.3 08/15/2022 06:21 PM     08/15/2022 06:21 PM    CO2 18 08/15/2022 06:21 PM    BUN 25 08/15/2022 06:21 PM    CREATININE 1.46 08/15/2022 06:21 PM    GFRAA 57 08/15/2022 06:21 PM    LABGLOM 47 08/15/2022 06:21 PM    GLUCOSE 97 08/15/2022 06:21 PM    PROT 7.6 08/15/2022 06:21 PM    CALCIUM 9.4 08/15/2022 06:21 PM    BILITOT 0.35 08/15/2022 06:21 PM    ALKPHOS 98 08/15/2022 06:21 PM    AST 34 08/15/2022 06:21 PM    ALT 28 08/15/2022 06:21 PM       POC Tests: No results for input(s): POCGLU, POCNA, POCK, POCCL, POCBUN, POCHEMO, POCHCT in the last 72 hours.    Coags: No results found for: PROTIME, INR, APTT    HCG (If Applicable): No results found for: PREGTESTUR, PREGSERUM, HCG, HCGQUANT     ABGs: No results found for: PHART, PO2ART, YQX0SPO, ZGW2XXJ, BEART, [id number]     Type & Screen (If Applicable):  No results found for: LABABO, LABRH    Drug/Infectious Status (If Applicable):  No results found for: HIV, HEPCAB    COVID-19 Screening (If Applicable): No results found for: COVID19        Anesthesia Evaluation  Patient summary reviewed and Nursing notes reviewed no history of anesthetic complications:   Airway: Mallampati: IV  TM distance: >3 FB   Neck ROM: limited  Mouth opening: > = 3 FB   Dental:      Comment: Only lower front teeth remain    Pulmonary:Negative Pulmonary ROS and normal exam                               Cardiovascular:    (+) valvular problems/murmurs: MR, murmur: Grade 2, Mitral,         Rhythm: regular  Rate: normal                    Neuro/Psych:               GI/Hepatic/Renal:   (+) renal disease: CRI,           Endo/Other:                      ROS comment: Osteomyelitis of toe Abdominal:             Vascular: Other Findings:           Anesthesia Plan      general     ASA 3       Induction: intravenous. MIPS: Postoperative opioids intended and Prophylactic antiemetics administered. Anesthetic plan and risks discussed with patient. Plan discussed with CRNA.                     Erika Jaime MD   8/18/2022

## 2022-08-19 PROBLEM — Z95.5 STATUS POST PRIMARY ANGIOPLASTY WITH CORONARY STENT: Status: ACTIVE | Noted: 2022-08-19

## 2022-08-19 PROBLEM — I73.9 PERIPHERAL ARTERIAL DISEASE (HCC): Status: ACTIVE | Noted: 2022-08-19

## 2022-08-19 PROBLEM — N18.32 STAGE 3B CHRONIC KIDNEY DISEASE (HCC): Status: ACTIVE | Noted: 2022-08-19

## 2022-08-19 PROBLEM — I25.10 CORONARY ARTERY DISEASE INVOLVING NATIVE HEART WITHOUT ANGINA PECTORIS: Status: ACTIVE | Noted: 2022-08-19

## 2022-08-19 LAB
ALBUMIN SERPL-MCNC: 3.2 G/DL (ref 3.5–5.2)
ANION GAP SERPL CALCULATED.3IONS-SCNC: 11 MMOL/L (ref 9–17)
BUN BLDV-MCNC: 19 MG/DL (ref 8–23)
CALCIUM SERPL-MCNC: 9 MG/DL (ref 8.6–10.4)
CHLORIDE BLD-SCNC: 104 MMOL/L (ref 98–107)
CO2: 21 MMOL/L (ref 20–31)
CREAT SERPL-MCNC: 1.29 MG/DL (ref 0.7–1.2)
GFR AFRICAN AMERICAN: >60 ML/MIN
GFR NON-AFRICAN AMERICAN: 55 ML/MIN
GFR SERPL CREATININE-BSD FRML MDRD: ABNORMAL ML/MIN/{1.73_M2}
GLUCOSE BLD-MCNC: 133 MG/DL (ref 70–99)
PHOSPHORUS: 2.9 MG/DL (ref 2.5–4.5)
POTASSIUM SERPL-SCNC: 4.8 MMOL/L (ref 3.7–5.3)
SODIUM BLD-SCNC: 136 MMOL/L (ref 135–144)
VANCOMYCIN RANDOM: 22.1 UG/ML

## 2022-08-19 PROCEDURE — 2580000003 HC RX 258

## 2022-08-19 PROCEDURE — 6370000000 HC RX 637 (ALT 250 FOR IP)

## 2022-08-19 PROCEDURE — 6370000000 HC RX 637 (ALT 250 FOR IP): Performed by: NURSE PRACTITIONER

## 2022-08-19 PROCEDURE — 80202 ASSAY OF VANCOMYCIN: CPT

## 2022-08-19 PROCEDURE — 99232 SBSQ HOSP IP/OBS MODERATE 35: CPT | Performed by: INTERNAL MEDICINE

## 2022-08-19 PROCEDURE — APPSS30 APP SPLIT SHARED TIME 16-30 MINUTES: Performed by: NURSE PRACTITIONER

## 2022-08-19 PROCEDURE — 36415 COLL VENOUS BLD VENIPUNCTURE: CPT

## 2022-08-19 PROCEDURE — 80069 RENAL FUNCTION PANEL: CPT

## 2022-08-19 PROCEDURE — 6370000000 HC RX 637 (ALT 250 FOR IP): Performed by: STUDENT IN AN ORGANIZED HEALTH CARE EDUCATION/TRAINING PROGRAM

## 2022-08-19 PROCEDURE — 6360000002 HC RX W HCPCS

## 2022-08-19 PROCEDURE — 1200000000 HC SEMI PRIVATE

## 2022-08-19 RX ORDER — MORPHINE SULFATE 2 MG/ML
2 INJECTION, SOLUTION INTRAMUSCULAR; INTRAVENOUS
Status: DISCONTINUED | OUTPATIENT
Start: 2022-08-19 | End: 2022-08-20 | Stop reason: HOSPADM

## 2022-08-19 RX ORDER — OXYCODONE HYDROCHLORIDE AND ACETAMINOPHEN 5; 325 MG/1; MG/1
1 TABLET ORAL EVERY 4 HOURS PRN
Status: DISCONTINUED | OUTPATIENT
Start: 2022-08-19 | End: 2022-08-20 | Stop reason: HOSPADM

## 2022-08-19 RX ORDER — MORPHINE SULFATE 4 MG/ML
4 INJECTION, SOLUTION INTRAMUSCULAR; INTRAVENOUS
Status: DISCONTINUED | OUTPATIENT
Start: 2022-08-19 | End: 2022-08-20 | Stop reason: HOSPADM

## 2022-08-19 RX ORDER — OXYCODONE HYDROCHLORIDE AND ACETAMINOPHEN 5; 325 MG/1; MG/1
2 TABLET ORAL EVERY 4 HOURS PRN
Status: DISCONTINUED | OUTPATIENT
Start: 2022-08-19 | End: 2022-08-20 | Stop reason: HOSPADM

## 2022-08-19 RX ADMIN — OXYCODONE HYDROCHLORIDE AND ACETAMINOPHEN 1 TABLET: 5; 325 TABLET ORAL at 20:29

## 2022-08-19 RX ADMIN — OXYCODONE HYDROCHLORIDE AND ACETAMINOPHEN 2 TABLET: 5; 325 TABLET ORAL at 05:11

## 2022-08-19 RX ADMIN — VANCOMYCIN HYDROCHLORIDE 1000 MG: 10 INJECTION, POWDER, LYOPHILIZED, FOR SOLUTION INTRAVENOUS at 22:05

## 2022-08-19 RX ADMIN — METRONIDAZOLE 500 MG: 500 TABLET, FILM COATED ORAL at 14:15

## 2022-08-19 RX ADMIN — METRONIDAZOLE 500 MG: 500 TABLET, FILM COATED ORAL at 22:18

## 2022-08-19 RX ADMIN — OXYCODONE HYDROCHLORIDE AND ACETAMINOPHEN 2 TABLET: 5; 325 TABLET ORAL at 09:22

## 2022-08-19 RX ADMIN — OXYCODONE HYDROCHLORIDE AND ACETAMINOPHEN 2 TABLET: 5; 325 TABLET ORAL at 14:15

## 2022-08-19 RX ADMIN — ACETAMINOPHEN 650 MG: 325 TABLET ORAL at 20:29

## 2022-08-19 RX ADMIN — METRONIDAZOLE 500 MG: 500 TABLET, FILM COATED ORAL at 06:00

## 2022-08-19 RX ADMIN — ACETAMINOPHEN 650 MG: 325 TABLET ORAL at 00:24

## 2022-08-19 RX ADMIN — SODIUM CHLORIDE, PRESERVATIVE FREE 10 ML: 5 INJECTION INTRAVENOUS at 09:18

## 2022-08-19 RX ADMIN — SODIUM CHLORIDE, PRESERVATIVE FREE 10 ML: 5 INJECTION INTRAVENOUS at 20:32

## 2022-08-19 ASSESSMENT — PAIN SCALES - GENERAL
PAINLEVEL_OUTOF10: 8
PAINLEVEL_OUTOF10: 3
PAINLEVEL_OUTOF10: 8

## 2022-08-19 ASSESSMENT — PAIN DESCRIPTION - ORIENTATION
ORIENTATION: RIGHT

## 2022-08-19 ASSESSMENT — PAIN DESCRIPTION - PAIN TYPE: TYPE: SURGICAL PAIN

## 2022-08-19 ASSESSMENT — PAIN DESCRIPTION - DESCRIPTORS
DESCRIPTORS: ACHING;DISCOMFORT;THROBBING
DESCRIPTORS: DISCOMFORT
DESCRIPTORS: ACHING;DISCOMFORT;THROBBING

## 2022-08-19 ASSESSMENT — PAIN DESCRIPTION - LOCATION
LOCATION: FOOT
LOCATION: HEAD;FOOT
LOCATION: FOOT
LOCATION: FOOT

## 2022-08-19 ASSESSMENT — PAIN - FUNCTIONAL ASSESSMENT: PAIN_FUNCTIONAL_ASSESSMENT: PREVENTS OR INTERFERES SOME ACTIVE ACTIVITIES AND ADLS

## 2022-08-19 NOTE — ANESTHESIA POSTPROCEDURE EVALUATION
POST- ANESTHESIA EVALUATION       Pt Name: Paula Romero  MRN: [id number]  YOB: 1950  Date of evaluation: 8/19/2022  Time:  1:56 AM      /72   Pulse 67   Temp 97.8 °F (36.6 °C) (Oral)   Resp 16   Ht 6' (1.829 m)   Wt 195 lb (88.5 kg)   SpO2 94%   BMI 26.45 kg/m²      Consciousness Level  Awake  Cardiopulmonary Status  Stable  Pain Adequately Treated YES  Nausea / Vomiting  NO  Adequate Hydration  YES  Anesthesia Related Complications NONE      Electronically signed by Lisa Cisneros MD on 8/19/2022 at 1:56 AM       Department of Anesthesiology  Postprocedure Note    Patient: Paula Romero  MRN: [id number]  YOB: 1950  Date of evaluation: 8/19/2022      Procedure Summary     Date: 08/18/22 Room / Location: [address]    Anesthesia Start: 2059 Anesthesia Stop: 2204    Procedure: PARTIAL FIRST RAY AMPUTATION (Right) Diagnosis:       Osteomyelitis, unspecified site, unspecified type ([address]      (OSTEOMYELITIS)    Surgeons: Perez Rob DPM Responsible Provider: Lisa Cisneros MD    Anesthesia Type: general ASA Status: 3          Anesthesia Type: No value filed.     Jazmine Phase I: Jazmine Score: 10    Jazmine Phase II:        Anesthesia Post Evaluation

## 2022-08-19 NOTE — PLAN OF CARE
Problem: Pain  Goal: Verbalizes/displays adequate comfort level or baseline comfort level  8/19/2022 1853 by Lucrecia Salguero  Outcome: Progressing  8/19/2022 0521 by Senia Suh RN  Outcome: Progressing     Problem: Safety - Adult  Goal: Free from fall injury  8/19/2022 1853 by Lucrecia Salguero  Outcome: Progressing  8/19/2022 0521 by Senia Suh RN  Outcome: Progressing     Problem: ABCDS Injury Assessment  Goal: Absence of physical injury  8/19/2022 1853 by Lucrecia Salguero  Outcome: Progressing  8/19/2022 0521 by Senia Suh RN  Outcome: Progressing     Problem: Skin/Tissue Integrity  Goal: Absence of new skin breakdown  Description: 1. Monitor for areas of redness and/or skin breakdown  2. Assess vascular access sites hourly  3. Every 4-6 hours minimum:  Change oxygen saturation probe site  4. Every 4-6 hours:  If on nasal continuous positive airway pressure, respiratory therapy assess nares and determine need for appliance change or resting period.   8/19/2022 1853 by Lucrecia Salguero  Outcome: Progressing  8/19/2022 0521 by Senia Suh RN  Outcome: Progressing     Problem: Discharge Planning  Goal: Discharge to home or other facility with appropriate resources  8/19/2022 1853 by Lucrecia Salguero  Outcome: Progressing  8/19/2022 0521 by Senia Suh RN  Outcome: Progressing

## 2022-08-19 NOTE — PLAN OF CARE
Problem: Pain  Goal: Verbalizes/displays adequate comfort level or baseline comfort level  8/19/2022 0521 by Natali Garrison RN  Outcome: Progressing  8/18/2022 1820 by Winston Zapein RN  Outcome: Progressing     Problem: Safety - Adult  Goal: Free from fall injury  8/19/2022 0521 by Natali Garrison RN  Outcome: Progressing  8/18/2022 1820 by Winston Zapien RN  Outcome: Progressing     Problem: ABCDS Injury Assessment  Goal: Absence of physical injury  8/19/2022 0521 by Natali Garrison RN  Outcome: Progressing  8/18/2022 1820 by Winston Zapien RN  Outcome: Progressing     Problem: Skin/Tissue Integrity  Goal: Absence of new skin breakdown  Description: 1. Monitor for areas of redness and/or skin breakdown  2. Assess vascular access sites hourly  3. Every 4-6 hours minimum:  Change oxygen saturation probe site  4. Every 4-6 hours:  If on nasal continuous positive airway pressure, respiratory therapy assess nares and determine need for appliance change or resting period.   8/19/2022 0521 by Natali Garrison RN  Outcome: Progressing  8/18/2022 1820 by Winston Zapien RN  Outcome: Progressing     Problem: Discharge Planning  Goal: Discharge to home or other facility with appropriate resources  Outcome: Progressing

## 2022-08-19 NOTE — PROGRESS NOTES
[facility] - Progress Note    Today's Date and Time: 8/19/2022, 8:34 AM    Impression :   Cellulitis and abscess of right hallux. 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 Continue for a few days post toe amputation on 8-18-22. Wound culture with MRSA and bacteroides fragilis  Toe amputation completed 8/18/22 per Podiatry     Medical Decision Making/Summary/Discussion:8/19/2022       Infection Control Recommendations   [place] Precautions  Contact Isolation     Antimicrobial Stewardship Recommendations     Simplification of therapy  Targeted therapy  PK dosing    Coordination of Outpatient Care:   Estimated Length of IV antimicrobials:TBD  Patient will need Midline Catheter Insertion: TBD  Patient will need PICC line Insertion:  Patient will need: Home IV , [place],  SNF,  LTAC: TBD  Patient will need outpatient wound care:Yes    Chief complaint/reason for consultation:   Cellulitis of right hallux    History of Present Illness:   Selena Lane is a 66y.o.-year-old  male who was initially admitted on 8/15/2022. Patient seen at the request of Dr. Trudy Laguna. INITIAL HISTORY:  Patient was sent to the hospital today for admission by his PCP. Patient  has a history of CKD stage 3 and hypertension. He is not diabetic, is not on immunosuppressive meds, and has no history of trauma or surgery to the right foot. Patient states his toe has been swelling intermittently for one month and he first noticed drainage 2 weeks ago. Patient has been applying dressings at home with antibiotic ointments. Patient has not treated with any other methods. Patient relates minor tenderness to the site with minimal drainage.  Drainage noted to distoplantar aspect of hallux as well as dorsal medial aspect over the IPJ. Patient has no previous history of cellulitis, ulcerations, or amputations to his feet. Patient denies any other constitutional symptoms. Patient denies nausea, fever, vomiting, chills, SOB at this time. No pain on palpation to right hallux. Right hallux is edematous. Pulses palpable on left, non-palpable on right. Drainage is foul smelling. Probes to bone. Purulent drainage. Aerobic and anaerobic cultures taken. Xrays of the right foot pending  Labs pending    Patient is afebrile. VSS on admission. Patient awake, alert, oriented    Evaluated by Podiatry  Foot Xray: Poor quality. Possible erosion distal tuft of hallux. MRI foot: Acute osteomyelitis of proximal and distal phalanx of hallux. Possible septic arthritis of first IPJ. Podiatry is planning for hallux amputation either 22 or 22    CURRENT EVALUATION : 2022    Afebrile  VS stable on room air    Patient feels better  No complaints  No new issues noted    The patient underwent partial amputation of the first ray on 22  Operative Findings: Distal and proximal phalanx of the right hallux was necrotic and nonviable. Purulent drainage upon incision at the MTPJ, right. Articular cartilage of the first metatarsal head of poor quality    Bone culture taken    Cultures:  8/15/2022 blood: No growth x2  8/15/2022 wound: MRSA & bacteroides fragilis  22 bone: No growth thus far    Labs, X rays reviewed: 2022    BUN: 36-->25  Cr: 1.69-->1.46    WBC:11.3-->9.8  Hb:12.2-->11  Plat: 258-->337    Cultures:  Urine:    Blood:  8/15/22: No growth    Sputum :    Wound:  8-15-22: MRSA, light growth    Imagin22: MRI Foot  Impression   Acute osteomyelitis of the entire or near entire 1st proximal and distal   phalanges. Findings consistent with septic arthritis of the 1st IP joint.        Plantar ulcer at the distal great toe with the linear fluid-filled tract   extending to the level of the distal phalangeal tuft. 8-16-22:            8-15-22:            Discussed with patient, RN, Podiatry. I have personally reviewed the past medical history, past surgical history, medications, social history, and family history, and I have updated the database accordingly. Past Medical History:   History reviewed. No pertinent past medical history. Past Surgical  History:     Past Surgical History:   Procedure Laterality Date    TOE AMPUTATION Right 08/18/2022    AMPUTATION OF HALLUX       Medications:      vancomycin  1,000 mg IntraVENous Q24H    metroNIDAZOLE  500 mg Oral 3 times per day    sodium chloride flush  5-40 mL IntraVENous 2 times per day    enoxaparin  40 mg SubCUTAneous Daily       Social History:     Social History     Socioeconomic History    Marital status:      Spouse name: Not on file    Number of children: Not on file    Years of education: Not on file    Highest education level: Not on file   Occupational History    Not on file   Tobacco Use    Smoking status: Not on file    Smokeless tobacco: Not on file   Substance and Sexual Activity    Alcohol use: Not on file    Drug use: Not on file    Sexual activity: Not on file   Other Topics Concern    Not on file   Social History Narrative    Not on file     Social Determinants of Health     Financial Resource Strain: Not on file   Food Insecurity: Not on file   Transportation Needs: Not on file   Physical Activity: Not on file   Stress: Not on file   Social Connections: Not on file   Intimate Partner Violence: Not on file   Housing Stability: Not on file       Family History:   No family history on file. Allergies:   Patient has no known allergies. Review of Systems:   Constitutional: No fevers or chills. No systemic complaints  Head: No headaches  Eyes: No double vision or blurry vision. No conjunctival inflammation. ENT: No sore throat or runny nose. . No hearing loss, tinnitus or vertigo.   Cardiovascular: No chest pain or palpitations. No shortness of breath. No SULLIVAN  Lung: No shortness of breath or cough. No sputum production  Abdomen: No nausea, vomiting, diarrhea, or abdominal pain. [place] No cramps. Genitourinary: No increased urinary frequency, or dysuria. No hematuria. No suprapubic or CVA pain. CKD3  Musculoskeletal: No muscle aches or pains. S/p right great toe amputation  Hematologic: No bleeding or bruising. Neurologic: No headache, weakness, numbness, or tingling. Integument: No rash, s/p right large toe amputation  Psychiatric: No depression. Endocrine: No polyuria, no polydipsia, no polyphagia. Physical Examination :   Patient Vitals for the past 8 hrs:   BP Temp Temp src Pulse Resp SpO2   08/19/22 0742 127/70 97.8 °F (36.6 °C) Oral 70 16 97 %   08/19/22 0541 -- -- -- -- 16 --   08/19/22 0415 127/70 97.9 °F (36.6 °C) Oral 70 16 --     General Appearance: Awake, alert, and in no apparent distress  Head:  Normocephalic, no trauma  Eyes: Pupils equal, round, reactive to light and accommodation; extraocular movements intact; sclera anicteric; conjunctivae pink. No embolic phenomena. ENT: Oropharynx clear, without erythema, exudate, or thrush. No tenderness of sinuses. Mouth/throat: mucosa pink and moist. No lesions. Dentition in good repair. Neck:Supple, without lymphadenopathy. Thyroid normal, No bruits. Pulmonary/Chest: Clear to auscultation, without wheezes, rales, or rhonchi. No dullness to percussion. Cardiovascular: Regular rate and rhythm without murmurs, rubs, or gallops. Poor bloodflow to b/l extremities. Abdomen: Soft, non tender. Bowel sounds normal. No organomegaly  All four Extremities: right great toe amputation-dressing intact  Neurologic: No gross sensory or motor deficits. Skin: Warm and dry with good turgor. Signs of peripheral arterial  insufficiency.  Right great toe amp with dressing intact    Medical Decision Making -Laboratory:   I have independently reviewed/ordered the following labs:    CBC with Differential:   No results for input(s): WBC, HGB, HCT, PLT, SEGSPCT, BANDSPCT, LYMPHOPCT, MONOPCT, EOSPCT in the last 72 hours. BMP:   No results for input(s): NA, K, CL, CO2, BUN, CREATININE, CA, MG in the last 72 hours. Hepatic Function Panel:   No results for input(s): PROT, LABALBU, BILIDIR, IBILI, BILITOT, ALKPHOS, ALT, AST in the last 72 hours. No results for input(s): RPR in the last 72 hours. No results for input(s): HIV in the last 72 hours. No results for input(s): BC in the last 72 hours. Lab Results   Component Value Date/Time    RBC 3.38 08/15/2022 06:21 PM    WBC 9.8 08/15/2022 06:21 PM     Lab Results   Component Value Date/Time    CREATININE 1.46 08/15/2022 06:21 PM    GLUCOSE 97 08/15/2022 06:21 PM       Medical Decision Making-Imaging:     EXAMINATION:   TWO XRAY VIEWS OF THE RIGHT FOOT       8/15/2022 7:28 pm       COMPARISON:   None. HISTORY:   ORDERING SYSTEM PROVIDED HISTORY: cellulitis and abscess   TECHNOLOGIST PROVIDED HISTORY:   cellulitis and abscess       FINDINGS:   Erosive changes of the tuft of the 1st distal phalanx. Diffuse osseous   demineralization. Normal midfoot alignment is grossly maintained. No   fracture or dislocation. Questionable soft tissue ulceration at the distal   aspect of the 1st toe with soft tissue swelling. Impression   Questionable soft tissue ulceration of the distal aspect of the 1st toe and   erosive changes of the tuft of the 1st distal phalanx compatible with   osteomyelitis. Medical Decision [id number]:   Culture, Anaerobic and Aerobic  Order: [id number]  Status: Preliminary result    Visible to patient: No (not released)    Next appt: None    Specimen Information: Foot        0 Result Notes    Component 8/15/22 1842    Specimen Description . FOOT P    Direct Exam NO NEUTROPHILS SEEN P    Direct Exam NO ORGANISMS SEEN P    Culture METHICILLIN RESISTANT STAPHYLOCOCCUS AUREUS LIGHT me. I have reviewed the laboratory data, other diagnostic studies and discussed them with the APRN. I have updated the medical record where necessary. I agree with the assessment, plan and orders as documented by the APRN.     Isabel Atkins MD.      Pager: [phone] - Office: [phone]

## 2022-08-19 NOTE — PROGRESS NOTES
Meka Lenz MD,MD      PCP: No primary care provider on file. 8/19/2022  5:39 PM    Admitting Diagnosis:  Cellulitis and abscess of toe of right foot    Problem List:  Active Hospital Problems    Diagnosis Date Noted    Stage 3b chronic kidney disease ([facility] [address] [N18.32] 08/19/2022     Priority: Medium    Peripheral arterial disease ([facility] [I73.9] 08/19/2022     Priority: Medium    Coronary artery disease involving native heart without angina pectoris [I25.10] 08/19/2022     Priority: Medium    Status post primary angioplasty with coronary stent [Z95.5] 08/19/2022     Priority: Medium    Cellulitis of great toe of right foot [L03.031] 08/15/2022     Priority: Medium    Cellulitis and abscess of toe of right foot [L03.031, L02.611] 08/15/2022     Priority: Medium       Subjective: The patient is status post partial first ray amputation of the right foot. The patient states that Percocet is providing some pain relief. He states that Dr. Janae Shanks came in and did the surgery last evening. Today the patient was anxious to go home but when he tried to get up he had dizziness similar to vertigo. He could not walk or stand.     Allergies:  No Known Allergies    Current Medications:  morphine (PF) injection 2 mg, Q2H PRN   Or  morphine injection 4 mg, Q2H PRN  oxyCODONE-acetaminophen (PERCOCET) 5-325 MG per tablet 1 tablet, Q4H PRN   Or  oxyCODONE-acetaminophen (PERCOCET) 5-325 MG per tablet 2 tablet, Q4H PRN  vancomycin (VANCOCIN) 1000 mg in sodium chloride 0.9% 250 mL IVPB, Q24H  metroNIDAZOLE (FLAGYL) tablet 500 mg, 3 times per day  sodium chloride flush 0.9 % injection 5-40 mL, 2 times per day  sodium chloride flush 0.9 % injection 5-40 mL, PRN  0.9 % sodium chloride infusion, PRN  enoxaparin (LOVENOX) injection 40 mg, Daily  ondansetron (ZOFRAN-ODT) disintegrating tablet 4 mg, Q8H PRN   Or  ondansetron (ZOFRAN) injection 4 mg, Q6H PRN  polyethylene glycol (GLYCOLAX) packet 17 g, Daily PRN  acetaminophen (TYLENOL) tablet 650 mg, Q6H PRN   Or  acetaminophen (TYLENOL) suppository 650 mg, Q6H PRN        Physical Examination:  /70   Pulse 70   Temp 97.8 °F (36.6 °C) (Oral)   Resp 18   Ht 6' (1.829 m)   Wt 195 lb (88.5 kg)   SpO2 97%   BMI 26.45 kg/m²     General appearance - alert, well appearing, and in no distress  Mental status - alert, oriented to person, place, and time  Eyes - pupils equal and reactive, extraocular eye movements intact  Nose - normal and patent, no erythema, discharge or polyps  Mouth - mucous membranes moist, pharynx normal without lesions  Neck - supple, no significant adenopathy  Lymphatics - no palpable lymphadenopathy, no hepatosplenomegaly  Chest - clear to auscultation, no wheezes, rales or rhonchi, symmetric air entry  Heart - normal rate and regular rhythm, S1 and S2 normal, systolic murmur 3/6 at 2nd right intercostal space  Abdomen - soft, nontender, nondistended, no masses or organomegaly  Back exam - full range of motion, no tenderness, palpable spasm or pain on motion  Neurological - alert, oriented, normal speech, no focal findings or movement disorder noted  Musculoskeletal - no joint tenderness, deformity or swelling  Extremities -no clubbing or cyanosis, right foot is bandaged  Skin - normal coloration and turgor, no rashes, no suspicious skin lesions noted  The right foot is bandaged    Labs:  Hematology:  No results for input(s): WBC, HGB, HCT, PLT, ESR, INR in the last 72 hours.     Invalid input(s): PT  Chemistry:  Recent Labs     08/19/22  0753      K 4.8      CO2 21   GLUCOSE 133*   BUN 19   CREATININE 1.29*   CALCIUM 9.0   PHOS 2.9     Glucose:  Recent Labs     08/19/22  0753   GLUCOSE 133*     HgBA1c:  No results found for: LABA1C      Plan:   Patient Active Problem List   Diagnosis    Cellulitis of great toe of right foot    Cellulitis and abscess of toe of right foot    Stage 3b chronic kidney disease ([facility] [address]    Peripheral arterial disease (HCC) Coronary artery disease involving native heart without angina pectoris    Status post primary angioplasty with coronary stent   #1 dizziness/vertigo-continue to monitor the patient closely. Will consider giving 1 L of normal saline. We will also monitor electrolytes BUN and creatinine. #2 essential hypertension-the patient blood pressures under reasonable control. Continue to monitor blood pressure. Continue with current antihypertensive regimen. #3 chronic kidney disease stage IIIa-continue to monitor BUN and creatinine. Avoid nephrotoxic agents. #4 right great toe osteomyelitis-patient is status post partial amputation of the first ray on the right. Continue antibiotic therapy per the infectious disease specialist.  #5 coronary artery disease involving the native heart without angina pectoris-no current chest pain,. Continue with antianginals as needed. Occasions orders and labs were reviewed. Input from nursing staff was noted and appreciated. Continue current plan of care.      Electronically signed by Scott Woodson MD on 8/19/2022 at 5:39 PM

## 2022-08-19 NOTE — PROGRESS NOTES
[facility] Pharmacokinetic Monitoring Service - Vancomycin    Consulting Provider: Dr Jamal Dickerson   Indication: cellulitis   Target Concentration: Goal AUC/VIRAJ 400-600 mg*hr/L  Day of Therapy: 3  Additional Antimicrobials: flagyl     Pertinent Laboratory Values: Wt Readings from Last 1 Encounters:   08/16/22 195 lb (88.5 kg)     Temp Readings from Last 1 Encounters:   08/19/22 97.8 °F (36.6 °C) (Oral)     Estimated Creatinine Clearance: 56 mL/min (A) (based on SCr of 1.29 mg/dL (H)). Recent Labs     08/19/22  0753   CREATININE 1.29*     Procalcitonin:     Pertinent Cultures:  Culture Date Source Results   8/19 Foot  MRSA   BATEROIDES FRAGILIS BETA LACTAMASE POSITIVE   MRSA Nasal Swab: N/A. Non-respiratory infection.     Recent vancomycin administrations                     vancomycin (VANCOCIN) 1000 mg in sodium chloride 0.9% 250 mL IVPB (mg) 1,000 mg New Bag 08/18/22 2252    vancomycin (VANCOCIN) 1250 mg in sodium chloride 0.9% 250 mL IVPB (mg) 1,250 mg New Bag 08/17/22 2130    vancomycin (VANCOCIN) 1250 mg in sodium chloride 0.9% 250 mL IVPB (mg) 1,250 mg New Bag 08/16/22 2200                    Assessment:  Date/Time Current Dose Concentration Timing of Concentration (h) AUC   8/19 1000 MG Q 24 HRS  22.1 9 HS POSE DOSE  445   Note: Serum concentrations collected for AUC dosing may appear elevated if collected in close proximity to the dose administered, this is not necessarily an indication of toxicity    Plan:  Current dosing regimen is therapeutic  Continue current dose  Repeat vancomycin concentration PRN   Pharmacy will continue to monitor patient and adjust therapy as indicated    Thank you for the consult,  Doneta Goldberg, [address] [place] [address]  8/19/2022 9:16 AM

## 2022-08-19 NOTE — OP NOTE
plain was administered to the right foot as a proximal Masterson block. Attention was directed to the medial aspect of the first MTPJ of the right foot where there was an ulceration to the distal aspect of the right hallux which probed to bone at the distal phalanx. Purulent drainage and malodor was appreciated. There was surrounding erythema with an increase in warmth. A #15 blade was utilized to create racquet shaped incision around the proximal phalanx base of the first digit. The incision was then extended proximally in a serpentine fashion along the medial aspect of the first metatarsal.  The ulceration was incorporated to the surgical incision in a elliptical fashion and excised. Purulent drainage was expressed immediately upon making incision. The incision was deepened to the level of periosteum utilizing sharp dissection. The first digit was disarticulated at the level of the MPJ and passed from the table. The distal extent of the first metatarsal was noted to be soft and necrotic consistent with osteomyelitis. The first metatarsal was then freed from all soft surrounding soft tissue structures utilizing sharp dissection. The first metatarsal was then transected utilizing an oscillating sagittal saw and excised. The first metatarsal was then resected until healthy marrow cavity and appropriate cortical density was noted. The excised portion of the metatarsal was then  on the back table for specimen to be sent to microbiology and pathology. A proximal clearance fragment was created at this time. Post irrigation a proximal clearance fragment was taken from the metatarsal utilizing an oscillating sagittal saw and passed to the back table to be sent for pathology. Throughout the dissection process no abscess was appreciated. At this time surgical site was irrigated with copious amounts of sterile saline via pulse lavage.   Surgical site was meticulously inspected to assess for remaining infection, determine viability, identify any bleeding vessels. All tendons and other avascular structures were excised. No further purulent drainage was appreciated, remaining tissue appeared healthy and viable without signs of infection or necrosis. All bleeding vessels were cauterized via Bovie electrocautery. Remaining bleeding was appropriate. There was noted to be redundant distal flap therefore surgical margins were remodeled to allow for adequate closure. Closure was performed utilizing 3-0 nylon. A small lateral portion of the incision was left open and packed with quarter inch iodoform packing to allow for drainage of any potential remaining infection. Flap edges were well approximated with minimal tension noted. Dressings consisted of adaptic, 4 x 4s, Kerlix and ACE bandage. The patient tolerated the above procedure and anesthesia well without complications. The patient was transported from the operating room to the PACU with vital signs stable and vascular status intact to the right foot.           Electronically signed by Janice Miller DPM on 8/19/2022 at 8:38 AM

## 2022-08-19 NOTE — PROGRESS NOTES
Progress Note  Podiatric Medicine and Surgery     Subjective     CC: Right hallux cellulitis    - Patient seen and examined at bedside.   - Patient resting comfortably in his bed.  - Pain at night at amputation site. Pain panels ordered. - Afebrile, vital signs stable       HPI :  Aliyah Abebe is a 66 y.o. male seen at [facility] for right hallux cellulitis and possible abscess. Patient is not a diabetic. Patient has a history of CKD 3, hypertension. Patient was sent in by his PCP today. Patient does not follow a podiatrist.  Patient states for the last month his toe has been swelling intermittently. There is an ulceration at the distal aspect of the right hallux and at the dorsomedial aspect over the IPJ of the right hallux. Both are draining purulent fluid. Patient denies numbness bilaterally. He noticed draining about 2 weeks ago. He has been wrapping it with antibiotic ointment at home. Patient denies a history of cellulitis, ulcerations, or amputations to his feet bilaterally. Patient denies any previous surgery to his feet bilaterally. Patient admits to poor blood flow to feet bilaterally. Patient denies any other constitutional symptoms at this time including nausea, fever, vomiting, chills, shortness of breath. Has noticed mild drainage or blood coming from the site. Patient is afebrile at this time and vital signs are stable. Patient is awake alert and oriented. Denies any other pedal complaints at this time. PCP is No primary care provider on file. ROS: Denies N/V/F/C/SOB/CP. Otherwise negative except at stated in the HPI.      Medications:  Scheduled Meds:   vancomycin  1,000 mg IntraVENous Q24H    metroNIDAZOLE  500 mg Oral 3 times per day    sodium chloride flush  5-40 mL IntraVENous 2 times per day    enoxaparin  40 mg SubCUTAneous Daily       Continuous Infusions:   sodium chloride 5 mL/hr at 08/16/22 0049       PRN Meds:morphine **OR** morphine, oxyCODONE-acetaminophen **OR** oxyCODONE-acetaminophen, sodium chloride flush, sodium chloride, ondansetron **OR** ondansetron, polyethylene glycol, acetaminophen **OR** acetaminophen    Objective     Vitals:  Patient Vitals for the past 8 hrs:   BP Temp Temp src Pulse Resp SpO2   22 0541 -- -- -- -- 16 --   22 0415 127/70 97.9 °F (36.6 °C) Oral 70 16 --   22 0015 125/72 97.8 °F (36.6 °C) Oral 67 16 94 %       Average, Min, and Max for last 24 hours Vitals:  TEMPERATURE:  Temp  Av.7 °F (36.5 °C)  Min: 96.8 °F (36 °C)  Max: 98.2 °F (36.8 °C)    RESPIRATIONS RANGE: Resp  Avg: 15.4  Min: 12  Max: 16    PULSE RANGE: Pulse  Av.8  Min: 65  Max: 80    BLOOD PRESSURE RANGE:  Systolic (63TOP), PD , Min:125 , HAP:554   ; Diastolic (16MAT), IWN:09, Min:64, Max:82      PULSE OXIMETRY RANGE: SpO2  Av %  Min: 94 %  Max: 97 %    No intake/output data recorded. CBC:  No results for input(s): WBC, HGB, HCT, PLT, CRP in the last 72 hours. Invalid input(s):  ESR       BMP:  No results for input(s): NA, K, CL, CO2, BUN, CREATININE, GLUCOSE, CALCIUM in the last 72 hours. Coags:  No results for input(s): APTT, PROT, INR in the last 72 hours. Lab Results   Component Value Date    SEDRATE 103 (H) 08/15/2022     No results for input(s): CRP in the last 72 hours. Lower Extremity Physical Exam:  Vascular: DP and PT pulses are palpable on the left, dopplerable on right. CFT <3 seconds to all digits. Hair growth is absent to the level of the digits. Bilateral pitting edema present bilaterally. Neuro: Saph/sural/SP/DP/plantar sensation intact to light touch. No loss of protective sensation noted bilaterally     Musculoskeletal: Muscle strength is 5/5 to all lower extremity muscle groups. Gross deformity is absent. Status post right hallux amputation. Mild pain to the area the amputation site. Dermatologic: Incision site sutures are intact.   There is a small area left open for drainage and iodoform packing. No purulence appreciated, no foul odor appreciated. Clinical Images:              Imaging:   MRI FOOT RIGHT WO CONTRAST   Final Result   Acute osteomyelitis of the entire or near entire 1st proximal and distal   phalanges. Findings consistent with septic arthritis of the 1st IP joint. Plantar ulcer at the distal great toe with the linear fluid-filled tract   extending to the level of the distal phalangeal tuft. VL LOWER EXTREMITY ARTERIAL SEGMENTAL PRESSURES W PPG   Final Result      XR FOOT RIGHT (2 VIEWS)   Final Result   Questionable soft tissue ulceration of the distal aspect of the 1st toe and   erosive changes of the tuft of the 1st distal phalanx compatible with   osteomyelitis. VASCULAR REPORT    (Results Pending)       Cultures: MRSA, Bacteroides fragilis    Assessment   Celestine Brown is a 66 y.o. male with   Osteomyelitis, right hallux  Right hallux cellulitis  Diabetic foot ulcer down to bone, right foot  CKD 3  Lower extremity edema bilaterally    Principal Problem:    Cellulitis and abscess of toe of right foot  Active Problems:    Cellulitis of great toe of right foot  Resolved Problems:    * No resolved hospital problems. *       Plan     Patient examined and evaluated at bedside  Patient dressing was changed today: Iodoform packing, 4 4 gauze, Kerlix and Ace. Progress tolerated in surgical shoe to the heel only. ID on board. Plan to remove iodoform tomorrow, will suture closed the patient be stable for discharge tomorrow. Patient care discussed with Dr. Ole Black.     Kourtney Slaughter DPM   Podiatric Medicine & Surgery   8/19/2022 at 6:45 AM

## 2022-08-19 NOTE — BRIEF OP NOTE
Brief Postoperative Note      Patient: Faustino Schmitt  YOB: 1950  MRN: [id number]    Date of Procedure: 8/18/2022    Pre-Op Diagnosis: OSTEOMYELITIS, right hallux    Post-Op Diagnosis: Same       Procedure: Partial first ray amputation, right foot    Surgeon(s):  Marlene Mathis DPM    Assistant:  Resident: Liliya Ding DPM    Anesthesia: General    Estimated Blood Loss (mL): less than 100     Hemostasis: Direct pressure     Complications: None    Injectables: 10cc 1:1 1% lidocaine plain and half percent Marcaine plain    Specimens:   ID Type Source Tests Collected by Time Destination   A :  Tissue Toe SURGICAL PATHOLOGY Marlene Mathis DPM 8/18/2022 2136        Implants:  * No implants in log *      Drains: * No LDAs found *    Findings: Distal and proximal phalanx of the right hallux was necrotic and nonviable. Purulent drainage upon incision at the MTPJ, right. Articular cartilage of the first metatarsal head of poor quality.     Electronically signed by Liliya Ding DPM on 8/18/2022 at 10:11 PM

## 2022-08-20 VITALS
OXYGEN SATURATION: 98 % | HEIGHT: 72 IN | BODY MASS INDEX: 26.41 KG/M2 | DIASTOLIC BLOOD PRESSURE: 72 MMHG | HEART RATE: 67 BPM | TEMPERATURE: 98.1 F | RESPIRATION RATE: 16 BRPM | WEIGHT: 195 LBS | SYSTOLIC BLOOD PRESSURE: 124 MMHG

## 2022-08-20 LAB
CULTURE: NORMAL
CULTURE: NORMAL
Lab: NORMAL
Lab: NORMAL
SPECIMEN DESCRIPTION: NORMAL
SPECIMEN DESCRIPTION: NORMAL

## 2022-08-20 PROCEDURE — 99232 SBSQ HOSP IP/OBS MODERATE 35: CPT | Performed by: INTERNAL MEDICINE

## 2022-08-20 PROCEDURE — 6370000000 HC RX 637 (ALT 250 FOR IP): Performed by: STUDENT IN AN ORGANIZED HEALTH CARE EDUCATION/TRAINING PROGRAM

## 2022-08-20 PROCEDURE — 2580000003 HC RX 258

## 2022-08-20 PROCEDURE — 6370000000 HC RX 637 (ALT 250 FOR IP): Performed by: NURSE PRACTITIONER

## 2022-08-20 PROCEDURE — 6360000002 HC RX W HCPCS

## 2022-08-20 RX ORDER — METRONIDAZOLE 500 MG/1
500 TABLET ORAL EVERY 8 HOURS SCHEDULED
Qty: 23 TABLET | Refills: 0 | Status: SHIPPED | OUTPATIENT
Start: 2022-08-20 | End: 2022-08-28

## 2022-08-20 RX ORDER — LINEZOLID 600 MG/1
600 TABLET, FILM COATED ORAL 2 TIMES DAILY
Qty: 28 TABLET | Refills: 0 | Status: SHIPPED | OUTPATIENT
Start: 2022-08-20 | End: 2022-09-03

## 2022-08-20 RX ORDER — OXYCODONE HYDROCHLORIDE AND ACETAMINOPHEN 5; 325 MG/1; MG/1
1 TABLET ORAL EVERY 6 HOURS PRN
Qty: 28 TABLET | Refills: 0 | Status: SHIPPED | OUTPATIENT
Start: 2022-08-20 | End: 2022-08-27

## 2022-08-20 RX ADMIN — OXYCODONE HYDROCHLORIDE AND ACETAMINOPHEN 1 TABLET: 5; 325 TABLET ORAL at 16:59

## 2022-08-20 RX ADMIN — METRONIDAZOLE 500 MG: 500 TABLET, FILM COATED ORAL at 05:41

## 2022-08-20 RX ADMIN — METRONIDAZOLE 500 MG: 500 TABLET, FILM COATED ORAL at 14:08

## 2022-08-20 RX ADMIN — SODIUM CHLORIDE, PRESERVATIVE FREE 10 ML: 5 INJECTION INTRAVENOUS at 09:19

## 2022-08-20 RX ADMIN — ENOXAPARIN SODIUM 40 MG: 100 INJECTION SUBCUTANEOUS at 09:20

## 2022-08-20 RX ADMIN — OXYCODONE HYDROCHLORIDE AND ACETAMINOPHEN 1 TABLET: 5; 325 TABLET ORAL at 05:44

## 2022-08-20 ASSESSMENT — PAIN DESCRIPTION - ORIENTATION
ORIENTATION: RIGHT
ORIENTATION: RIGHT

## 2022-08-20 ASSESSMENT — PAIN DESCRIPTION - DESCRIPTORS
DESCRIPTORS: DISCOMFORT;ACHING
DESCRIPTORS: ACHING;DULL;DISCOMFORT

## 2022-08-20 ASSESSMENT — PAIN DESCRIPTION - LOCATION
LOCATION: FOOT;LEG
LOCATION: FOOT

## 2022-08-20 ASSESSMENT — PAIN SCALES - GENERAL
PAINLEVEL_OUTOF10: 6
PAINLEVEL_OUTOF10: 6

## 2022-08-20 ASSESSMENT — PAIN - FUNCTIONAL ASSESSMENT: PAIN_FUNCTIONAL_ASSESSMENT: ACTIVITIES ARE NOT PREVENTED

## 2022-08-20 NOTE — DISCHARGE SUMMARY
Hospital Medicine Discharge Summary      Patient ID: Sawyer Mccoy      Patient's PCP: No primary care provider on file. Admit Date: 8/15/2022     Discharge Date:  8/20/2022     Admitting Physician: Jojo Antunez MD    Discharge Physician: Jojo Antunez MD     Discharge Diagnoses: Active Hospital Problems    Diagnosis Date Noted    Stage 3b chronic kidney disease ([facility] [address] [N18.32] 08/19/2022     Priority: Medium    Peripheral arterial disease ([facility] [address] [I73.9] 08/19/2022     Priority: Medium    Coronary artery disease involving native heart without angina pectoris [I25.10] 08/19/2022     Priority: Medium    Status post primary angioplasty with coronary stent [Z95.5] 08/19/2022     Priority: Medium    Cellulitis of great toe of right foot [L03.031] 08/15/2022     Priority: Medium    Cellulitis and abscess of toe of right foot [L03.031, L02.611] 08/15/2022     Priority: Medium       The patient was seen and examined on day of discharge and this discharge summary is in conjunction with any daily progress note from day of discharge. Hospital Course: The patient is a 26-year-old  male with essential hypertension, nephrolithiasis, and aortic stenosis as well as osteoarthritis of the knees and chronic kidney disease stage IIIa who presented to his primary MDs office with right great toe swelling and redness for 2 weeks and drainage for 1 week. Upon evaluation in the MDs office the patient was noted to have a cellulitis and possible abscess of the right great toe. The patient was directly admitted to [facility] for IV antibiotic therapy and an infectious disease consultation. The patient was started on IV vancomycin and IV Zosyn upon admission. Podiatry was consulted as well. The patient had an x-ray of the right foot which suggested osteomyelitis involving the right great toe. The patient underwent an MRI of the right foot which confirmed the diagnosis of osteomyelitis.   On 8/18/2022 the patient underwent amputation of the right great toe/partial first ray amputation of the right foot. The surgeon was Dr. Judy Renee. The patient's IV antibiotics were transitioned to p.o. once the cultures were available. Patient was discharged on Zyvox 600 mg twice daily for 7 days and Flagyl 500 mg every 8 hours for 7 days. The patient was discharged in stable condition. Consults:  ID and podiatry    Significant Diagnostic Studies: as above, and as follows: MRI of the right foot    Treatments: as above    Disposition: home    Discharged Condition: Stable    Follow Up: Dr. Floresita Winston is the primary physician, the patient will follow-up in two weeks    Discharge Medications:      Medication List        START taking these medications      linezolid 600 MG tablet  Commonly known as: Zyvox  Take 1 tablet by mouth 2 times daily for 14 days     metroNIDAZOLE 500 MG tablet  Commonly known as: FLAGYL  Take 1 tablet by mouth every 8 hours for 23 doses     oxyCODONE-acetaminophen 5-325 MG per tablet  Commonly known as: PERCOCET  Take 1 tablet by mouth every 6 hours as needed for Pain for up to 7 days. Where to Get Your Medications        You can get these medications from any pharmacy    Bring a paper prescription for each of these medications  linezolid 600 MG tablet  metroNIDAZOLE 500 MG tablet  oxyCODONE-acetaminophen 5-325 MG per tablet          Activity: activity as tolerated    Diet: regular diet    Time Spent on discharge is more than 30 minutes in the examination, evaluation, counseling and review of medications and discharge plan. Electronically signed by Belkis Hill MD on 8/20/2022 at 2:59 PM     Thank you No primary care provider on file. for the opportunity to be involved in this patient's care.

## 2022-08-20 NOTE — PLAN OF CARE
Problem: Pain  Goal: Verbalizes/displays adequate comfort level or baseline comfort level  Outcome: Completed     Problem: Safety - Adult  Goal: Free from fall injury  Outcome: Completed     Problem: ABCDS Injury Assessment  Goal: Absence of physical injury  Outcome: Completed     Problem: Skin/Tissue Integrity  Goal: Absence of new skin breakdown  Description: 1. Monitor for areas of redness and/or skin breakdown  2. Assess vascular access sites hourly  3. Every 4-6 hours minimum:  Change oxygen saturation probe site  4. Every 4-6 hours:  If on nasal continuous positive airway pressure, respiratory therapy assess nares and determine need for appliance change or resting period.   Outcome: Completed     Problem: Discharge Planning  Goal: Discharge to home or other facility with appropriate resources  Outcome: Completed

## 2022-08-20 NOTE — PROGRESS NOTES
[facility] - Progress Note    Today's Date and Time: 8/20/2022, 10:46 AM    Impression :   Cellulitis and abscess of right hallux. 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 Continue for a few days post toe amputation on 8-18-22. Wound culture with MRSA and bacteroides fragilis  Toe amputation completed 8/18/22 per Podiatry     Medical Decision Making/Summary/Discussion:8/20/2022       Infection Control Recommendations   Anderson Precautions  Contact Isolation     Antimicrobial Stewardship Recommendations     Simplification of therapy  Targeted therapy  PK dosing    Coordination of Outpatient Care:   Estimated Length of IV antimicrobials:TBD  Patient will need Midline Catheter Insertion: TBD  Patient will need PICC line Insertion:  Patient will need: Home IV , [place],  SNF,  LTAC: TBD  Patient will need outpatient wound care:Yes    Chief complaint/reason for consultation:   Cellulitis of right hallux    History of Present Illness:   Jaden Maya is a 66y.o.-year-old  male who was initially admitted on 8/15/2022. Patient seen at the request of Dr. Jeanice Cheadle. INITIAL HISTORY:  Patient was sent to the hospital today for admission by his PCP. Patient  has a history of CKD stage 3 and hypertension. He is not diabetic, is not on immunosuppressive meds, and has no history of trauma or surgery to the right foot. Patient states his toe has been swelling intermittently for one month and he first noticed drainage 2 weeks ago. Patient has been applying dressings at home with antibiotic ointments. Patient has not treated with any other methods. Patient relates minor tenderness to the site with minimal drainage.  Drainage noted to distoplantar aspect of hallux as well as dorsal medial aspect over the IPJ. Patient has no previous history of cellulitis, ulcerations, or amputations to his feet. Patient denies any other constitutional symptoms. Patient denies nausea, fever, vomiting, chills, SOB at this time. No pain on palpation to right hallux. Right hallux is edematous. Pulses palpable on left, non-palpable on right. Drainage is foul smelling. Probes to bone. Purulent drainage. Aerobic and anaerobic cultures taken. Xrays of the right foot pending  Labs pending    Patient is afebrile. VSS on admission. Patient awake, alert, oriented    Evaluated by Podiatry  Foot Xray: Poor quality. Possible erosion distal tuft of hallux. MRI foot: Acute osteomyelitis of proximal and distal phalanx of hallux. Possible septic arthritis of first IPJ. Podiatry is planning for hallux amputation either 22 or 22    CURRENT EVALUATION : 2022    Afebrile  VS stable on room air    Patient feels better  No complaints  No new issues noted    The patient underwent partial amputation of the first ray on 22  Operative Findings: Distal and proximal phalanx of the right hallux was necrotic and nonviable. Purulent drainage upon incision at the MTPJ, right. Articular cartilage of the first metatarsal head of poor quality    Bone culture taken    Cultures:  8/15/2022 blood: No growth x2  8/15/2022 wound: MRSA & bacteroides fragilis  22 bone: No growth thus far    Labs, X rays reviewed: 2022    BUN: 36-->25  Cr: 1.69-->1.46    WBC:11.3-->9.8  Hb:12.2-->11  Plat: 258-->337    Cultures:  Urine:    Blood:  8/15/22: No growth    Sputum :    Wound:  8-15-22: MRSA, light growth    Imagin22: MRI Foot  Impression   Acute osteomyelitis of the entire or near entire 1st proximal and distal   phalanges. Findings consistent with septic arthritis of the 1st IP joint.        Plantar ulcer at the distal great toe with the linear fluid-filled tract   extending to the level of the distal phalangeal tuft. 8-16-22:            8-15-22:            Discussed with patient, RN, Podiatry. I have personally reviewed the past medical history, past surgical history, medications, social history, and family history, and I have updated the database accordingly. Past Medical History:   History reviewed. No pertinent past medical history. Past Surgical  History:     Past Surgical History:   Procedure Laterality Date    TOE AMPUTATION Right 08/18/2022    PARTIAL FIRST RAY AMPUTATION performed by Mart Knight DPM at [facility] OR       Medications:      vancomycin  1,000 mg IntraVENous Q24H    metroNIDAZOLE  500 mg Oral 3 times per day    sodium chloride flush  5-40 mL IntraVENous 2 times per day    enoxaparin  40 mg SubCUTAneous Daily       Social History:     Social History     Socioeconomic History    Marital status:      Spouse name: Not on file    Number of children: Not on file    Years of education: Not on file    Highest education level: Not on file   Occupational History    Not on file   Tobacco Use    Smoking status: Not on file    Smokeless tobacco: Not on file   Substance and Sexual Activity    Alcohol use: Not on file    Drug use: Not on file    Sexual activity: Not on file   Other Topics Concern    Not on file   Social History Narrative    Not on file     Social Determinants of Health     Financial Resource Strain: Not on file   Food Insecurity: Not on file   Transportation Needs: Not on file   Physical Activity: Not on file   Stress: Not on file   Social Connections: Not on file   Intimate Partner Violence: Not on file   Housing Stability: Not on file       Family History:   No family history on file. Allergies:   Patient has no known allergies. Review of Systems:   Constitutional: No fevers or chills. No systemic complaints  Head: No headaches  Eyes: No double vision or blurry vision. No conjunctival inflammation. ENT: No sore throat or runny nose. Erleen Kim  No hearing loss, tinnitus or vertigo. Cardiovascular: No chest pain or palpitations. No shortness of breath. No SULLIVAN  Lung: No shortness of breath or cough. No sputum production  Abdomen: No nausea, vomiting, diarrhea, or abdominal pain. Ebony Stands No cramps. Genitourinary: No increased urinary frequency, or dysuria. No hematuria. No suprapubic or CVA pain. CKD3  Musculoskeletal: No muscle aches or pains. S/p right great toe amputation  Hematologic: No bleeding or bruising. Neurologic: No headache, weakness, numbness, or tingling. Integument: No rash, s/p right large toe amputation  Psychiatric: No depression. Endocrine: No polyuria, no polydipsia, no polyphagia. Physical Examination :   Patient Vitals for the past 8 hrs:   BP Temp Temp src Pulse Resp SpO2   08/20/22 0740 139/72 97.7 °F (36.5 °C) Oral 70 16 97 %       General Appearance: Awake, alert, and in no apparent distress  Head:  Normocephalic, no trauma  Eyes: Pupils equal, round, reactive to light and accommodation; extraocular movements intact; sclera anicteric; conjunctivae pink. No embolic phenomena. ENT: Oropharynx clear, without erythema, exudate, or thrush. No tenderness of sinuses. Mouth/throat: mucosa pink and moist. No lesions. Dentition in good repair. Neck:Supple, without lymphadenopathy. Thyroid normal, No bruits. Pulmonary/Chest: Clear to auscultation, without wheezes, rales, or rhonchi. No dullness to percussion. Cardiovascular: Regular rate and rhythm without murmurs, rubs, or gallops. Poor bloodflow to b/l extremities. Abdomen: Soft, non tender. Bowel sounds normal. No organomegaly  All four Extremities: right great toe amputation-dressing intact  Neurologic: No gross sensory or motor deficits. Skin: Warm and dry with good turgor. Signs of peripheral arterial  insufficiency.  Right great toe amp with dressing intact    Medical Decision Making -Laboratory:   I have independently reviewed/ordered the following labs:    CBC with Differential: No results for input(s): WBC, HGB, HCT, PLT, SEGSPCT, BANDSPCT, LYMPHOPCT, MONOPCT, EOSPCT in the last 72 hours. BMP:   Recent Labs     08/19/22  0753      K 4.8      CO2 21   BUN 19   CREATININE 1.29*     Hepatic Function Panel:   Recent Labs     08/19/22  0753   LABALBU 3.2*     No results for input(s): RPR in the last 72 hours. No results for input(s): HIV in the last 72 hours. No results for input(s): BC in the last 72 hours. Lab Results   Component Value Date/Time    RBC 3.38 08/15/2022 06:21 PM    WBC 9.8 08/15/2022 06:21 PM     Lab Results   Component Value Date/Time    CREATININE 1.29 08/19/2022 07:53 AM    GLUCOSE 133 08/19/2022 07:53 AM       Medical Decision Making-Imaging:     EXAMINATION:   TWO XRAY VIEWS OF THE RIGHT FOOT       8/15/2022 7:28 pm       COMPARISON:   None. HISTORY:   ORDERING SYSTEM PROVIDED HISTORY: cellulitis and abscess   TECHNOLOGIST PROVIDED HISTORY:   cellulitis and abscess       FINDINGS:   Erosive changes of the tuft of the 1st distal phalanx. Diffuse osseous   demineralization. Normal midfoot alignment is grossly maintained. No   fracture or dislocation. Questionable soft tissue ulceration at the distal   aspect of the 1st toe with soft tissue swelling. Impression   Questionable soft tissue ulceration of the distal aspect of the 1st toe and   erosive changes of the tuft of the 1st distal phalanx compatible with   osteomyelitis. Medical Decision Hbcldm-Ymwezzzr-Jixin:   Culture, Anaerobic and Aerobic  Order: [id number]  Status: Preliminary result    Visible to patient: No (not released)    Next appt: None    Specimen Information: Foot        0 Result Notes    Component 8/15/22 6182    Specimen Description . FOOT P    Direct Exam NO NEUTROPHILS SEEN P    Direct Exam NO ORGANISMS SEEN P    Culture METHICILLIN RESISTANT STAPHYLOCOCCUS AUREUS LIGHT GROWTH Abnormal  P    Culture BACTEROIDES FRAGILIS BETA LACTAMASE POSITIVE LIGHT GROWTH Abnormal  [address]          Susceptibility      Methicillin-Resistant Staphylococcus aureus (1)    Antibiotic Interpretation Microscan Method Status    penicillin Resistant >=0.5 BACTERIAL SUSCEPTIBILITY PANEL VIRAJ Preliminary    clindamycin Sensitive <=0.25 BACTERIAL SUSCEPTIBILITY PANEL VIRAJ Preliminary    erythromycin Resistant >=8 BACTERIAL SUSCEPTIBILITY PANEL VIRAJ Preliminary    gentamicin Sensitive <=0.5 BACTERIAL SUSCEPTIBILITY PANEL VIRAJ Preliminary     Gentamicin is used only in combination with other active agents that test susceptible. Induced Clind Resist Negative NEGATIVE BACTERIAL SUSCEPTIBILITY PANEL VIRAJ Preliminary    levofloxacin Sensitive 0.25 BACTERIAL SUSCEPTIBILITY PANEL VIRAJ Preliminary    oxacillin Resistant >=4 BACTERIAL SUSCEPTIBILITY PANEL VIRAJ Preliminary    tetracycline Sensitive <=1 BACTERIAL SUSCEPTIBILITY PANEL VIRAJ Preliminary    trimethoprim-sulfamethoxazole Sensitive <=10 BACTERIAL SUSCEPTIBILITY PANEL VIRAJ Preliminary    vancomycin Sensitive 1 BACTERIAL SUSCEPTIBILITY PANEL VIRAJ                  Medical Decision Making-Other:     Note:  Labs, medications, radiologic studies were reviewed with personal review of films  Large amounts of data were reviewed  Discussed with nursing Staff, Discharge planner  Infection Control and Prevention measures reviewed  All prior entries were reviewed  Administer medications as ordered  [facility]  Discharge planning reviewed  Follow up as outpatient. Thank you for allowing us to participate in the care of this patient. Please call with questions. KOLTON Coats    ATTESTATION:    I have discussed the case, including pertinent history and exam findings with the APRN. I have evaluated the  History, physical findings and pictures of the patient and the key elements of the encounter have been performed by me.  I have reviewed the laboratory data, other diagnostic studies and discussed them with the APRN. I have updated the medical record where necessary. I agree with the assessment, plan and orders as documented by the APRN.     Jayna Jsaon MD.      Pager: [phone] - Office: [phone]

## 2022-08-20 NOTE — DISCHARGE INSTRUCTIONS
hardness around operative area. Numb, tingling or cold toes. Toe(s) become white or bluish  Bandage becomes wet, soiled, or blood soaked (small amount of bleeding may be normal)  Increased or progressive drainage from surgical area. Follow up instructions: You will need to follow up with Dr. Talat Lehman MD.  Call when you get home to schedule or confirm your appointment. Call your Podiatrist office if you have any questions or concerns.

## 2022-08-20 NOTE — PROGRESS NOTES
Progress Note  Podiatric Medicine and Surgery     Subjective     CC: Right hallux cellulitis    - Patient seen and examined at bedside.   - Patient resting comfortably in his bed.  - Pain well controlled to amputation site. - Afebrile, vital signs stable       HPI :  Gagan Eduardo is a 66 y.o. male seen at [facility] for right hallux cellulitis and possible abscess. Patient is not a diabetic. Patient has a history of CKD 3, hypertension. Patient was sent in by his PCP today. Patient does not follow a podiatrist.  Patient states for the last month his toe has been swelling intermittently. There is an ulceration at the distal aspect of the right hallux and at the dorsomedial aspect over the IPJ of the right hallux. Both are draining purulent fluid. Patient denies numbness bilaterally. He noticed draining about 2 weeks ago. He has been wrapping it with antibiotic ointment at home. Patient denies a history of cellulitis, ulcerations, or amputations to his feet bilaterally. Patient denies any previous surgery to his feet bilaterally. Patient admits to poor blood flow to feet bilaterally. Patient denies any other constitutional symptoms at this time including nausea, fever, vomiting, chills, shortness of breath. Has noticed mild drainage or blood coming from the site. Patient is afebrile at this time and vital signs are stable. Patient is awake alert and oriented. Denies any other pedal complaints at this time. PCP is No primary care provider on file. ROS: Denies N/V/F/C/SOB/CP. Otherwise negative except at stated in the HPI.      Medications:  Scheduled Meds:   vancomycin  1,000 mg IntraVENous Q24H    metroNIDAZOLE  500 mg Oral 3 times per day    sodium chloride flush  5-40 mL IntraVENous 2 times per day    enoxaparin  40 mg SubCUTAneous Daily       Continuous Infusions:   sodium chloride 5 mL/hr at 08/16/22 0049       PRN Meds:morphine **OR** morphine, oxyCODONE-acetaminophen **OR** oxyCODONE-acetaminophen, sodium chloride flush, sodium chloride, ondansetron **OR** ondansetron, polyethylene glycol, acetaminophen **OR** acetaminophen    Objective     Vitals:  Patient Vitals for the past 8 hrs:   BP Temp Temp src Pulse Resp SpO2   22 0740 139/72 97.7 °F (36.5 °C) Oral 70 16 97 %       Average, Min, and Max for last 24 hours Vitals:  TEMPERATURE:  Temp  Av.8 °F (36.6 °C)  Min: 97.7 °F (36.5 °C)  Max: 97.9 °F (36.6 °C)    RESPIRATIONS RANGE: Resp  Av.7  Min: 16  Max: 19    PULSE RANGE: Pulse  Av.5  Min: 65  Max: 70    BLOOD PRESSURE RANGE:  Systolic (93UMQ), EJE:451 , Min:121 , IQR:734   ; Diastolic (93LWK), ZWC:54, Min:69, Max:72      PULSE OXIMETRY RANGE: SpO2  Av.5 %  Min: 96 %  Max: 97 %    I/O last 3 completed shifts: In: 1150 [P.O.:550; I.V.:600]  Out: 305 [Urine:300; Blood:5]    CBC:  No results for input(s): WBC, HGB, HCT, PLT, CRP in the last 72 hours. Invalid input(s):  ESR       BMP:  Recent Labs     22  0753      K 4.8      CO2 21   BUN 19   CREATININE 1.29*   GLUCOSE 133*   CALCIUM 9.0          Coags:  No results for input(s): APTT, PROT, INR in the last 72 hours. Lab Results   Component Value Date    SEDRATE 103 (H) 08/15/2022     No results for input(s): CRP in the last 72 hours. Lower Extremity Physical Exam:  Vascular: DP and PT pulses are palpable on the left, dopplerable on right. CFT <3 seconds to all digits. Hair growth is absent to the level of the digits. Bilateral pitting edema present bilaterally. Neuro: Saph/sural/SP/DP/plantar sensation intact to light touch. No loss of protective sensation noted bilaterally     Musculoskeletal: Muscle strength is 5/5 to all lower extremity muscle groups. Gross deformity is absent. Status post right hallux amputation. Mild pain to the area the amputation site. Dermatologic: Incision site sutures are intact.   There is a small area left open for drainage and iodoform packing. No purulence appreciated, no foul odor appreciated. Mild sanguinous drainage appreciated. Clinical Images:        Imaging:   MRI FOOT RIGHT WO CONTRAST   Final Result   Acute osteomyelitis of the entire or near entire 1st proximal and distal   phalanges. Findings consistent with septic arthritis of the 1st IP joint. Plantar ulcer at the distal great toe with the linear fluid-filled tract   extending to the level of the distal phalangeal tuft. VL LOWER EXTREMITY ARTERIAL SEGMENTAL PRESSURES W PPG   Final Result      XR FOOT RIGHT (2 VIEWS)   Final Result   Questionable soft tissue ulceration of the distal aspect of the 1st toe and   erosive changes of the tuft of the 1st distal phalanx compatible with   osteomyelitis. VASCULAR REPORT    (Results Pending)       Cultures: MRSA, Bacteroides fragilis    Assessment   Josee Sargent is a 66 y.o. male with   Osteomyelitis, right hallux  Right hallux cellulitis  Diabetic foot ulcer down to bone, right foot  CKD 3  Lower extremity edema bilaterally    Principal Problem:    Cellulitis and abscess of toe of right foot  Active Problems:    Cellulitis of great toe of right foot    Stage 3b chronic kidney disease (HCC)    Peripheral arterial disease (HCC)    Coronary artery disease involving native heart without angina pectoris    Status post primary angioplasty with coronary stent  Resolved Problems:    * No resolved hospital problems. *       Plan     Patient examined and evaluated at bedside  Patient dressing was changed today: Iodoform packing was removed. Remaining incision was closed with 3-0 Nylon. Dressing applied including betadine, 4x4 gauze, kerlix and ace. Surgical shoes ordered. May ambulated to right heel. Patient is stable for discharge today after infectious disease final antibiotic recommendations. Follow-up with Dr. Brannon Castellon within 1 week. Patient care discussed with Dr. Brannon Castellon.     DANIAL ManuleM   Podiatric Medicine & Surgery   8/20/2022 at 11:24 AM

## 2022-08-22 LAB
CULTURE: ABNORMAL
DIRECT EXAM: ABNORMAL
DIRECT EXAM: ABNORMAL
SPECIMEN DESCRIPTION: ABNORMAL
SURGICAL PATHOLOGY REPORT: NORMAL

## 2022-08-23 LAB
CULTURE: NORMAL
DIRECT EXAM: NORMAL
DIRECT EXAM: NORMAL
SPECIMEN DESCRIPTION: NORMAL

## 2022-09-01 NOTE — PROGRESS NOTES
Physician Progress Note      PATIENT:               Daniel Yepez  CSN #:                  [id number]  :                       1950  ADMIT DATE:       8/15/2022 4:22 PM  [address] [place] DATE:        2022 5:45 PM  RESPONDING  PROVIDER #:        Laxmi Egan TEXT:    Patient admitted with Osteomyelitis R hallux. Noted documentation of   Diabetic foot ulcer down to bone Right foot per Podiatry progress notes. Same   progress notes have documentation \"Patient is not a diabetic. If possible, please document in progress notes and discharge summary if you   are evaluating and /or treating any of the following: The medical record reflects the following:  Risk Factors: 66 yr old w/ hx HTN/CKD 3, Nephrolithiasis, aortic stenosis  Clinical Indicators: Podiatry daily progress note: Diabetic foot ulcer down to   bone, right foot, seen at [facility]. [facility] for right hallux cellulitis and   possible abscess. Patient is not a diabetic. Patient has a history of CKD 3,   hypertension  Glucose w/ daily labs     Treatment: lab monitoring, Podiatry consult, s/p Ray amputation, no oral   antihyperglycemic per [facility]    Thank you please contact me for any questions! Nereida Weeks RN, CCDS, CDI   Supervisor [phone]  Options provided:  -- Osteomyelitis due to Diabetes Type 2 confirmed, please provide clinical   indicators, please provide clinical indicators:. -- Osteomyelitis unrelated to  Diabetes Type 2 as patient does not have a   history of diabetes  -- Other - I will add my own diagnosis  -- Disagree - Not applicable / Not valid  -- Disagree - Clinically unable to determine / Unknown  -- Refer to Clinical Documentation Reviewer    PROVIDER RESPONSE TEXT:    Osteomyelitis unrelated to Diabetes Type 2 as patient does have a history of   diabetes.     Query created by: Krystal Mills on 2022 7:34 AM      Electronically signed by:  Viviane Cha 2022 12:23 PM

## 2023-09-21 NOTE — PLAN OF CARE
Nutrition Tips for Home:    Try to eat at least 6 small meals or snacks a day. You may want to set an alarm to eat every 2-3 hours.    Remember to include protein foods often such as eggs, nuts, nut butters, beans, meats, poultry and fish, milk, yogurt and cottage cheese, tofu/soy products.    Include high calorie foods like cheese, avocado, olive or canola oil, dried fruit and nuts, granola, and cream cheese.    Drink most fluids between meals instead of with meals.    You may try oral nutrition supplement drinks such as [place] Instant Breakfast, Boost or Ensure, Glucerna (for diabetics) or Nepro (for dialysis), or store brand version of supplements such as [employer].  o  Tips for adding oral supplements to your daily routine:  -Drink when taking medications if they can be taken with food  -Serve chilled or pour over ice  -Freeze into a popsicle or blend with ice cream and fruit into a shake  -Pour over cereal instead of milk  -Add to coffee instead of milk or cream  -Enjoy as a snack     Problem: Pain  Goal: Verbalizes/displays adequate comfort level or baseline comfort level  Outcome: Progressing     Problem: Safety - Adult  Goal: Free from fall injury  Outcome: Progressing     Problem: ABCDS Injury Assessment  Goal: Absence of physical injury  Outcome: Progressing

## (undated) DEVICE — GLOVE ORANGE PI 7   MSG9070

## (undated) DEVICE — GOWN,SIRUS,NONRNF,SETINSLV,XL,20/CS: Brand: MEDLINE

## (undated) DEVICE — HANDPIECE SET WITH COAXIAL HIGH FLOW TIP AND SUCTION TUBE: Brand: INTERPULSE

## (undated) DEVICE — CUFF REPROC TRNQT DPSB W/PLC RED 18IN

## (undated) DEVICE — THIN OFFSET (5.5 X 0.38 X 25.0MM)

## (undated) DEVICE — SOLUTION SCRB 4OZ 4% CHG H2O AIDED FOR PREOPERATIVE SKIN

## (undated) DEVICE — BANDAGE GZ W2XL75IN ST RAYON POLY CNFRM STRTCH LTWT

## (undated) DEVICE — GARMENT,MEDLINE,DVT,INT,CALF,MED, GEN2: Brand: MEDLINE

## (undated) DEVICE — SVMMC POD PK

## (undated) DEVICE — SUTURE NONABSORBABLE MONOFILAMENT 3-0 PS-1 18 IN BLK ETHILON 1663H

## (undated) DEVICE — PREMIUM DRY TRAY LF: Brand: MEDLINE INDUSTRIES, INC.

## (undated) DEVICE — SYRINGE, LUER LOCK, 10ML: Brand: MEDLINE

## (undated) DEVICE — INTENDED FOR TISSUE SEPARATION, AND OTHER PROCEDURES THAT REQUIRE A SHARP SURGICAL BLADE TO PUNCTURE OR CUT.: Brand: BARD-PARKER ® CARBON RIB-BACK BLADES

## (undated) DEVICE — SUTURE ABSORBABLE BRAIDED 2-0 CT-1 27 IN UD VICRYL J259H

## (undated) DEVICE — BLADE SAW W9MM D25MM THK64MM MIC S STL SAG OSC RECIP DISP

## (undated) DEVICE — GLOVE SURG SZ 6 THK91MIL LTX FREE SYN POLYISOPRENE ANTI

## (undated) DEVICE — BANDAGE,GAUZE,BULKEE II,4.5"X4.1YD,STRL: Brand: MEDLINE

## (undated) DEVICE — CONTAINER,SPECIMEN,4OZ,OR STRL: Brand: MEDLINE

## (undated) DEVICE — DRESSING PETRO W3XL3IN OIL EMUL N ADH GZ KNIT IMPREG CELOS